# Patient Record
Sex: MALE | Race: WHITE | Employment: OTHER | URBAN - METROPOLITAN AREA
[De-identification: names, ages, dates, MRNs, and addresses within clinical notes are randomized per-mention and may not be internally consistent; named-entity substitution may affect disease eponyms.]

---

## 2017-05-26 ENCOUNTER — TELEPHONE (OUTPATIENT)
Dept: CARDIOLOGY CLINIC | Age: 57
End: 2017-05-26

## 2017-06-16 ENCOUNTER — TELEPHONE (OUTPATIENT)
Dept: CARDIOLOGY CLINIC | Age: 57
End: 2017-06-16

## 2017-08-07 RX ORDER — SIMVASTATIN 20 MG/1
TABLET, FILM COATED ORAL
Qty: 90 TAB | Refills: 0 | Status: SHIPPED | OUTPATIENT
Start: 2017-08-07 | End: 2017-10-23 | Stop reason: SDUPTHER

## 2017-09-07 ENCOUNTER — OFFICE VISIT (OUTPATIENT)
Dept: CARDIOLOGY CLINIC | Age: 57
End: 2017-09-07

## 2017-09-07 VITALS
OXYGEN SATURATION: 97 % | DIASTOLIC BLOOD PRESSURE: 86 MMHG | BODY MASS INDEX: 36.51 KG/M2 | SYSTOLIC BLOOD PRESSURE: 135 MMHG | WEIGHT: 255 LBS | HEART RATE: 80 BPM | HEIGHT: 70 IN

## 2017-09-07 DIAGNOSIS — R06.00 DYSPNEA, UNSPECIFIED TYPE: Primary | ICD-10-CM

## 2017-09-07 DIAGNOSIS — E78.5 HYPERLIPIDEMIA, UNSPECIFIED HYPERLIPIDEMIA TYPE: ICD-10-CM

## 2017-09-07 NOTE — PROGRESS NOTES
Bryce Conroy is a 62 y.o. male with a history of borderline hypertension, hyperlipidemia and sleep apnea. He also has obesity. Mr. Ruba Cabrales is here today for follow up appointment. In December, 2015 he went to [facility] with some epigastric burning discomfort. He had a stress echocardiogram, which was abnormal.  Cardiac cath was performed, however there was no evidence of coronary artery disease. Mr. Ruba Cabrales is here today for follow up appointment. He says he has noticed some minimal shortness of breath only with exertion when he climbs 1-2 flights of stairs. However, on a flat surface he does not notice any symptoms. He denies any PND or lower extremity swelling, he denies any chest pain or chest tightness. His only dyspnea is with climbing 1-2 flights of stairs. He denies any presyncope or syncope. He denies any hospital admissions. Past Medical History:  Past Medical History:   Diagnosis Date    Borderline hypertension     GERD (gastroesophageal reflux disease)     Hyperlipidemia     Obesity     JONNATHAN (obstructive sleep apnea)     S/P cardiac cath 12/15    No evidence of CAD     Surgical History:  Past Surgical History:   Procedure Laterality Date    HX HEMORRHOIDECTOMY  1990    HX MOHS PROCEDURES  2006    HX TONSILLECTOMY  1960s    UPPER ARM/ELBOW SURGERY UNLISTED       Current Meds:   Current Outpatient Prescriptions   Medication Sig Dispense Refill    simvastatin (ZOCOR) 20 mg tablet TAKE 1 TABLET BY MOUTH NIGHTLY 90 Tab 0    omeprazole (PRILOSEC) 40 mg capsule Take 40 mg by mouth daily.  doxycycline monohydrate 40 mg capsule Take 40 mg by mouth every morning.  sertraline (ZOLOFT) 50 mg tablet Take  by mouth daily.  fluticasone (FLONASE) 50 mcg/actuation nasal spray nightly.  montelukast (SINGULAIR) 10 mg tablet Take 10 mg by mouth daily.  betamethasone-calcipotriene (TACLONEX) 0.005-0.064 % ointment Apply  to affected area daily.       nitroglycerin (NITROSTAT) 0.4 mg SL tablet 1 Tab by SubLINGual route every five (5) minutes as needed for Chest Pain. 1 Bottle 2    multivitamins-minerals-lutein (CENTRUM SILVER) Tab Take  by mouth.  aspirin 81 mg tablet Take  by mouth. Social History:  Social History   Substance Use Topics    Smoking status: Never Smoker    Smokeless tobacco: Never Used    Alcohol use No     BP Readings from Last 3 Encounters:   09/07/17 135/86   06/02/16 127/79   05/21/15 116/78     Pulse Readings from Last 3 Encounters:   09/07/17 80   06/02/16 69   05/21/15 75     Physical Exam   Constitutional: He is oriented to person, place, and time. He appears well-developed. HENT: Head: Normocephalic and atraumatic. Neck: No JVD present. Cardiovascular: Regular rhythm. No extrasystoles are present. Exam reveals no gallop and no friction rub. No murmur heard. Pulmonary/Chest: Breath sounds normal. No respiratory distress. He has no wheezes. He has no rales. Abdominal: No tenderness. He has no rebound and no guarding. Musculoskeletal: He exhibits no edema. REVIEW OF DATA:    Last Lipids  Lab Results   Component Value Date/Time    Cholesterol, total 140 08/19/2011 12:00 AM    HDL Cholesterol 36 08/19/2011 12:00 AM    LDL, calculated 72 08/19/2011 12:00 AM    Triglyceride 158 08/19/2011 12:00 AM    CHOL/HDL Ratio 5.1 06/18/2010 08:43 AM      FLP   (09/2013) , , HDL 31, LDL 70  (12/2015)     TC  122, , HDL 31, LDL 64    Cardiac catheterization(06/2010)   mild luminal irregularities of all three major epicardial arteries. No significant stenosis. LV ejection fraction was 55%. 2D echocardiogram (06/2010)  1. Left ventricular chamber dimensions and systolic function normal,  approximate ejection fraction of 65%. 2. RA mildly dilated. Systolic function normal.    3. RS slightly dilated. 4. LA mildly dilated. 5. No intracardiac thrombus, vegetation, or mass. No pericardial effusion. No obvious shunts. 6. Aortic root normal. Aortic valve structure and leaflet motion normal.      STRESS ECHO (12/15)  CONCLUSION:  EKG EVIDENCE OF ISCHEMIA ASSOCIATED WITH MILD HYPOKINESIS OF DISTAL SEPTUM ON STRESS/ECHO  STUDY, SUGGESTING POSITIVE STUDY FOR ISCHEMIA. TALKED TO DR. Rodney Pulliam. Protocol  Protocol: Haris  Resting heart rate: 72  Resting BP: 108 / 82  Peak HR: 159 96.4 % max pred  Peak stress BP: 174 / 64 Double Product: 96401  Total Duration: 6 min 10 s Stage Achieved: Stage II  Stress / Exercise Symptoms: Shortness of breath  Medication Dose with Stress: None administered  Blood Pressure Response to Stress / Exercise: Appropriate  Reason for ending test: Target heart rate achieved  Stress / Exercise Tolerance: Normal    CARDIAC CATH (12/15)  LV Function: 60 % . No MR. No AS. LVEDP: 11 mm Hg   RCA: Large, distally divided to PL and PDA branches and they are all patent  Left main: Normal  LAD: LAD and Diagonal, good caliber and patent  Circumflex: C0-dominant, large OM1, fair posterior circumflex, and they are all patent. ASSESSMENT and PLAN  Mr. Stone Khan is a pleasant 62 y.o.  gentleman with a history of hypertension, hyperlipidemia, and sleep apnea. Borderline Hypertension:  Not on any meds. Stable. Continue with diet and exercise. BP today 135/86 mm Hg    Hyperlipidemia:  Last lipid profile was in 11/15 with good LDL control. He remains on Simvastatin 20 mg daily without any side effects. Repeat Lipid profile. Sleep apnea:  He uses CPAP machine infrequently. Counseled to use regularly. Obesity:  His weight today is 254--->255 pounds. He still does not follow diet or exercise program. He was advised again during [facility] visit. Will proceed with echo to eval EF and PAP as he has some dyspnea only with climbing flight of stairs. No fluid overload or anginal symptoms. Cath in 12/15 without any CAD.     Recent cath normal without any CAD    Thank you for allowing me to participate in the patient's care. Feel free to call me if you have any questions or concerns.

## 2017-09-07 NOTE — MR AVS SNAPSHOT
Visit Information Date & Time Provider Department Dept. Phone Encounter #  
 9/7/2017  9:00 AM Farshad Moon  [facility] Specialist at [facility] [phone] [id number] Follow-up Instructions Return in about 1 year (around 9/7/2018). Upcoming Health Maintenance Date Due Hepatitis C Screening 1960 DTaP/Tdap/Td series (1 - Tdap) 3/13/1981 FOBT Q 1 YEAR AGE 50-75 3/13/2010 INFLUENZA AGE 9 TO ADULT 8/1/2017 Allergies as of 9/7/2017  Review Complete On: 9/7/2017 By: Christoph Ocampo No Known Allergies Current Immunizations  Never Reviewed No immunizations on file. Not reviewed this visit You Were Diagnosed With   
  
 Codes Comments Dyspnea, unspecified type    -  Primary ICD-10-CM: R06.00 
ICD-9-CM: 786.09 Hyperlipidemia, unspecified hyperlipidemia type     ICD-10-CM: E78.5 ICD-9-CM: 272.4 Vitals BP Pulse Height(growth percentile) Weight(growth percentile) SpO2 BMI  
 135/86 80 5' 10\" (1.778 m) 255 lb (115.7 kg) 97% 36.59 kg/m2 Smoking Status Never Smoker BMI and BSA Data Body Mass Index Body Surface Area  
 36.59 kg/m 2 2.39 m 2 Preferred Pharmacy Pharmacy Name Phone [facility] [address] [place] [address] [place] [facility] [phone] Your Updated Medication List  
  
   
This list is accurate as of: 9/7/17  9:10 AM.  Always use your most recent med list.  
  
  
  
  
 aspirin 81 mg tablet Take  by mouth. CENTRUM SILVER Tab tablet Generic drug:  multivitamins-minerals-lutein Take  by mouth. doxycycline monohydrate 40 mg capsule Take 40 mg by mouth every morning. FLONASE 50 mcg/actuation nasal spray Generic drug:  fluticasone  
nightly. nitroglycerin 0.4 mg SL tablet Commonly known as:  NITROSTAT  
1 Tab by SubLINGual route every five (5) minutes as needed for Chest Pain. omeprazole 40 mg capsule Commonly known as:  PRILOSEC Take 40 mg by mouth daily. simvastatin 20 mg tablet Commonly known as:  ZOCOR  
TAKE 1 TABLET BY MOUTH NIGHTLY SINGULAIR 10 mg tablet Generic drug:  montelukast  
Take 10 mg by mouth daily. TACLONEX 0.005-0.064 % topical ointment Generic drug:  calcipotriene-betamethasone Apply  to affected area daily. ZOLOFT 50 mg tablet Generic drug:  sertraline Take  by mouth daily. Follow-up Instructions Return in about 1 year (around 9/7/2018). To-Do List   
 09/07/2017 ECHO:  2D ECHO COMPLETE ADULT (TTE) W OR WO CONTR   
  
 09/07/2017 Lab:  LIPID PANEL Patient Instructions ECHO - dyspnea - call [phone] for results Fasting lipid profile Introducing \[facility] [place] [facility]\"" & [facility] SERVICES! Dear Celeste Sullivan: Thank you for requesting a [employer] account. Our records indicate that you have previously registered for a [employer] account but its currently inactive. Please call our [employer] support line at [phone]. Additional Information If you have questions, please visit the Frequently Asked Questions section of the Truant[employer] website at [email or web address] Remember, TruantToday is NOT to be used for urgent needs. For medical emergencies, dial 911. Now available from your iPhone and Android! Please provide this summary of care documentation to your next provider. Your primary care clinician is listed as Ben Ornelas. If you have any questions after today's visit, please call [phone].

## 2017-10-23 RX ORDER — SIMVASTATIN 20 MG/1
TABLET, FILM COATED ORAL
Qty: 90 TAB | Refills: 3 | Status: SHIPPED | OUTPATIENT
Start: 2017-10-23 | End: 2018-12-27 | Stop reason: SDUPTHER

## 2017-12-18 ENCOUNTER — HOSPITAL ENCOUNTER (OUTPATIENT)
Dept: NON INVASIVE DIAGNOSTICS | Age: 57
Discharge: HOME | End: 2017-12-18
Attending: INTERNAL MEDICINE
Payer: COMMERCIAL

## 2017-12-18 DIAGNOSIS — R06.00 DYSPNEA, UNSPECIFIED TYPE: ICD-10-CM

## 2017-12-18 PROCEDURE — 93306 TTE W/DOPPLER COMPLETE: CPT

## 2018-06-08 ENCOUNTER — OFFICE VISIT (OUTPATIENT)
Dept: CARDIOLOGY CLINIC | Age: 58
End: 2018-06-08

## 2018-06-08 VITALS
OXYGEN SATURATION: 98 % | SYSTOLIC BLOOD PRESSURE: 134 MMHG | DIASTOLIC BLOOD PRESSURE: 84 MMHG | HEART RATE: 79 BPM | BODY MASS INDEX: 36.79 KG/M2 | HEIGHT: 70 IN | WEIGHT: 257 LBS

## 2018-06-08 DIAGNOSIS — R00.2 PALPITATIONS: ICD-10-CM

## 2018-06-08 DIAGNOSIS — R06.02 SOB (SHORTNESS OF BREATH): Primary | ICD-10-CM

## 2018-06-08 RX ORDER — BUPROPION HYDROCHLORIDE 300 MG/1
TABLET ORAL
Refills: 0 | COMMUNITY
Start: 2018-04-05 | End: 2019-12-10

## 2018-06-08 NOTE — PROGRESS NOTES
1. Have you been to the ER, urgent care clinic since your last visit? Hospitalized since your last visit? No    2. Have you seen or consulted any other health care providers outside of the [address] [place] [address] since your last visit? Include any pap smears or colon screening.  No

## 2018-06-08 NOTE — MR AVS SNAPSHOT
[facility] 
 
 
 [address] [facility] [address] [address] 
[phone] Patient: Laurence Mistry MRN: [id number] Visit Information Date & Time Provider Department Dept. Phone Encounter #  
 6/8/2018 10:30 AM Nura Hall MD Cardio Specialist at [facility] [place] [phone] [id number] Follow-up Instructions Return in about 4 weeks (around 7/6/2018). Your Appointments 7/9/2018  9:00 AM  
Follow Up with Nehemiah Sánchez. Oksana Alcocer PA-C Cardio Specialist at [facility] Appt Note: after holter and labs [address] [facility] [address] [address]  
  
   
 [id number] [place] [address] [address] Upcoming Health Maintenance Date Due Hepatitis C Screening 1960 DTaP/Tdap/Td series (1 - Tdap) 3/13/1981 FOBT Q 1 YEAR AGE 50-75 3/13/2010 Influenza Age 5 to Adult 8/1/2018 Allergies as of 6/8/2018  Review Complete On: 6/8/2018 By: Meghann Camp RN No Known Allergies Current Immunizations  Never Reviewed No immunizations on file. Not reviewed this visit You Were Diagnosed With   
  
 Codes Comments SOB (shortness of breath)    -  Primary ICD-10-CM: R06.02 
ICD-9-CM: 786.05 Palpitations     ICD-10-CM: R00.2 ICD-9-CM: 785.1 Vitals BP Pulse Height(growth percentile) Weight(growth percentile) SpO2 BMI  
 134/84 79 5' 10\" (1.778 m) 257 lb (116.6 kg) 98% 36.88 kg/m2 Smoking Status Never Smoker BMI and BSA Data Body Mass Index Body Surface Area  
 36.88 kg/m 2 2.4 m 2 Preferred Pharmacy Pharmacy Name Phone [facility] [address] [address] [place] [facility] [phone] Your Updated Medication List  
  
   
This list is accurate as of 6/8/18 11:19 AM.  Always use your most recent med list.  
  
  
  
  
 aspirin 81 mg tablet Take  by mouth. buPROPion  mg XL tablet Commonly known as:  WELLBUTRIN XL  
TAKE 1 TABLET BY MOUTH EVERY DAY  
  
 CENTRUM SILVER Tab tablet Generic drug:  multivitamins-minerals-lutein Take  by mouth. doxycycline monohydrate 40 mg capsule Take 40 mg by mouth every morning. FLONASE 50 mcg/actuation nasal spray Generic drug:  fluticasone  
nightly. nitroglycerin 0.4 mg SL tablet Commonly known as:  NITROSTAT  
1 Tab by SubLINGual route every five (5) minutes as needed for Chest Pain. omeprazole 40 mg capsule Commonly known as:  PRILOSEC Take 40 mg by mouth daily. simvastatin 20 mg tablet Commonly known as:  ZOCOR  
TAKE 1 TABLET BY MOUTH NIGHTLY SINGULAIR 10 mg tablet Generic drug:  montelukast  
Take 10 mg by mouth daily. TACLONEX 0.005-0.064 % topical ointment Generic drug:  calcipotriene-betamethasone Apply  to affected area daily. ZOLOFT 50 mg tablet Generic drug:  sertraline Take  by mouth daily. We Performed the Following AMB POC EKG ROUTINE W/ 12 LEADS, INTER & REP [26261 CPT(R)] Follow-up Instructions Return in about 4 weeks (around 7/6/2018). Introducing hospitals & HEALTH SERVICES! Dear Sky Spangler: Thank you for requesting a [employer] account. Our records indicate that you have previously registered for a Exterity account but its currently inactive. Please call our [employer]ter[employer] support line at [phone]. Additional Information If you have questions, please visit the Frequently Asked Questions section of the [employer] website at [email or web address] Remember, [employer]ter[employer] is NOT to be used for urgent needs. For medical emergencies, dial 911. Now available from your iPhone and Android! Please provide this summary of care documentation to your next provider. Your primary care clinician is listed as Jacque Zhao. If you have any questions after today's visit, please call [phone].

## 2018-06-08 NOTE — LETTER
NOTIFICATION OF RETURN TO WORK / SCHOOL 
 
6/8/2018 11:19 AM 
 
Mr. Serene Mills 47 [address] [facility] [address] [address] To Whom It May Concern: 
 
Serene Bunch was under the care of [address] June 8, 2018 If there are questions or concerns please have the patient contact our office. Sincerely, Florencio Augustine MD

## 2018-06-08 NOTE — PROGRESS NOTES
Miguel Bar is a 62 y.o. male with a history of borderline hypertension, hyperlipidemia and sleep apnea. He also has obesity. Mr. Jeni Majano is here today for follow up appointment. He said he had two episodes of dizziness, one was on this past Monday and the other one was on Wednesday. He said the one on Monday happened while he was at home. There were no preceding symptoms of palpitations, however wife thinks that he probably was having palpitations. He looked somewhat cold and clammy. He said he's been eating and drinking okay. This episode happened twice. He did not have any chest pain or chest tightness. That lasted for a couple hours. He did not lose consciousness. He personally does not recall having any palpitations. He denied any sweating. He denied any heat exhaustion or working outside in the yard during these episodes. Last episode was more than 48 hours ago and he's been feeling fine since then. Otherwise he has no complaint of chest pain, chest tightness, PND or lower extremity swelling. Past Medical History:  Past Medical History:   Diagnosis Date    Borderline hypertension     GERD (gastroesophageal reflux disease)     Hyperlipidemia     Obesity     JONNATHAN (obstructive sleep apnea)     S/P cardiac cath 12/15    No evidence of CAD     Surgical History:  Past Surgical History:   Procedure Laterality Date    HX HEMORRHOIDECTOMY  1990    HX MOHS PROCEDURES  2006    HX TONSILLECTOMY  1960s    UPPER ARM/ELBOW SURGERY UNLISTED       Current Meds:   Current Outpatient Prescriptions   Medication Sig Dispense Refill    buPROPion XL (WELLBUTRIN XL) 300 mg XL tablet TAKE 1 TABLET BY MOUTH EVERY DAY  0    simvastatin (ZOCOR) 20 mg tablet TAKE 1 TABLET BY MOUTH NIGHTLY 90 Tab 3    omeprazole (PRILOSEC) 40 mg capsule Take 40 mg by mouth daily.  doxycycline monohydrate 40 mg capsule Take 40 mg by mouth every morning.  sertraline (ZOLOFT) 50 mg tablet Take  by mouth daily.       fluticasone (FLONASE) 50 mcg/actuation nasal spray nightly.  montelukast (SINGULAIR) 10 mg tablet Take 10 mg by mouth daily.  betamethasone-calcipotriene (TACLONEX) 0.005-0.064 % ointment Apply  to affected area daily.  nitroglycerin (NITROSTAT) 0.4 mg SL tablet 1 Tab by SubLINGual route every five (5) minutes as needed for Chest Pain. 1 Bottle 2    multivitamins-minerals-lutein (CENTRUM SILVER) Tab Take  by mouth.  aspirin 81 mg tablet Take  by mouth. Social History:  Social History   Substance Use Topics    Smoking status: Never Smoker    Smokeless tobacco: Never Used    Alcohol use No     BP Readings from Last 3 Encounters:   06/08/18 134/84   09/07/17 135/86   06/02/16 127/79     Pulse Readings from Last 3 Encounters:   06/08/18 79   09/07/17 80   06/02/16 69     Physical Exam   Constitutional: He is oriented to person, place, and time. He appears well-developed. HENT: Head: Normocephalic and atraumatic. Neck: No JVD present. Cardiovascular: Regular rhythm. No extrasystoles are present. Exam reveals no gallop and no friction rub. No murmur heard. Pulmonary/Chest: Breath sounds normal. No respiratory distress. He has no wheezes. He has no rales. Abdominal: No tenderness. He has no rebound and no guarding. Musculoskeletal: He exhibits no edema. REVIEW OF DATA:    Last Lipids  Lab Results   Component Value Date/Time    Cholesterol, total 140 08/19/2011 12:00 AM    HDL Cholesterol 36 (L) 08/19/2011 12:00 AM    LDL, calculated 72 08/19/2011 12:00 AM    Triglyceride 158 (H) 08/19/2011 12:00 AM    CHOL/HDL Ratio 5.1 (H) 06/18/2010 08:43 AM      FLP   (09/2013) , , HDL 31, LDL 70  (12/2015)     TC  122, , HDL 31, LDL 64    Cardiac catheterization(06/2010)   mild luminal irregularities of all three major epicardial arteries. No significant stenosis. LV ejection fraction was 55%.     2D echocardiogram (12/17)  Left ventricle: Systolic function was normal by visual assessment. Ejection   fraction was estimated to be 60 %. No  obvious wall motion abnormalities identified in the views obtained. Wall   thickness was moderately increased. Left  ventricular diastolic function parameters were normal for the patient's age. Right ventricle: Systolic function was normal.  Left atrium: The atrium was mildly dilated. Right atrium: The atrium was dilated. Mitral valve: There was mild regurgitation. Aortic valve: There was no stenosis. STRESS ECHO (12/15)  CONCLUSION:  EKG EVIDENCE OF ISCHEMIA ASSOCIATED WITH MILD HYPOKINESIS OF DISTAL SEPTUM ON STRESS/ECHO  STUDY, SUGGESTING POSITIVE STUDY FOR ISCHEMIA. TALKED TO DR. La Hurd. Protocol  Protocol: Haris  Resting heart rate: 72  Resting BP: 108 / 82  Peak HR: 159 96.4 % max pred  Peak stress BP: 174 / 64 Double Product: 87796  Total Duration: 6 min 10 s Stage Achieved: Stage II  Stress / Exercise Symptoms: Shortness of breath  Medication Dose with Stress: None administered  Blood Pressure Response to Stress / Exercise: Appropriate  Reason for ending test: Target heart rate achieved  Stress / Exercise Tolerance: Normal    CARDIAC CATH (12/15)  LV Function: 60 % . No MR. No AS. LVEDP: 11 mm Hg   RCA: Large, distally divided to PL and PDA branches and they are all patent  Left main: Normal  LAD: LAD and Diagonal, good caliber and patent  Circumflex: C0-dominant, large OM1, fair posterior circumflex, and they are all patent. ASSESSMENT and PLAN  Mr. Florentino Jordan is a pleasant 62 y.o.  gentleman with a history of hypertension, hyperlipidemia, and sleep apnea. Borderline Hypertension:  Not on any meds. Stable. BP today 134/84 mm Hg    Hyperlipidemia:  Last lipid profile was in 11/15 with good LDL control. He remains on Simvastatin 20 mg daily without any side effects. Repeat Lipid profile before next visit. Sleep apnea:  He uses CPAP machine infrequently. Still snores per wife.  Defer to PCP    Obesity:  His weight today is 254--->258 pounds. He still does not follow diet or exercise program. He was advised again during [facility] visit to start diet and life style modification. Dizziness:   No syncope or LOC. Two episode. He denies palpitations but wife think he did have one  No recurrence. ECHO in 12/17 with normal EF  No carotid bruit on exam  No heart failure on exam  Will place Holter monitor  EKG unremarkable today  Doubt cardiac etiology    Thank you for allowing me to participate in the patient's care. Feel free to call me if you have any questions or concerns.

## 2018-06-12 ENCOUNTER — HOSPITAL ENCOUNTER (OUTPATIENT)
Dept: LAB | Age: 58
Discharge: HOME | End: 2018-06-12
Attending: INTERNAL MEDICINE
Payer: COMMERCIAL

## 2018-06-12 ENCOUNTER — HOSPITAL ENCOUNTER (OUTPATIENT)
Dept: NON INVASIVE DIAGNOSTICS | Age: 58
Discharge: HOME | End: 2018-06-12
Attending: INTERNAL MEDICINE
Payer: COMMERCIAL

## 2018-06-12 DIAGNOSIS — R06.02 SOB (SHORTNESS OF BREATH): ICD-10-CM

## 2018-06-12 DIAGNOSIS — R00.2 PALPITATIONS: ICD-10-CM

## 2018-06-12 LAB
CHOLEST SERPL-MCNC: 131 MG/DL
HDLC SERPL-MCNC: 24 MG/DL (ref 40–60)
HDLC SERPL: 5.5 {RATIO} (ref 0–5)
LDLC SERPL CALC-MCNC: 67.6 MG/DL (ref 0–100)
LIPID PROFILE,FLP: ABNORMAL
TRIGL SERPL-MCNC: 197 MG/DL (ref ?–150)
VLDLC SERPL CALC-MCNC: 39.4 MG/DL

## 2018-06-12 PROCEDURE — 80061 LIPID PANEL: CPT | Performed by: INTERNAL MEDICINE

## 2018-06-12 PROCEDURE — 93225 XTRNL ECG REC<48 HRS REC: CPT

## 2018-06-12 PROCEDURE — 36415 COLL VENOUS BLD VENIPUNCTURE: CPT | Performed by: INTERNAL MEDICINE

## 2018-07-09 ENCOUNTER — OFFICE VISIT (OUTPATIENT)
Dept: CARDIOLOGY CLINIC | Age: 58
End: 2018-07-09

## 2018-07-09 VITALS
SYSTOLIC BLOOD PRESSURE: 127 MMHG | DIASTOLIC BLOOD PRESSURE: 81 MMHG | OXYGEN SATURATION: 97 % | WEIGHT: 262 LBS | BODY MASS INDEX: 37.51 KG/M2 | HEART RATE: 78 BPM | HEIGHT: 70 IN

## 2018-07-09 DIAGNOSIS — E78.5 DYSLIPIDEMIA: Primary | ICD-10-CM

## 2018-07-09 RX ORDER — LANOLIN ALCOHOL/MO/W.PET/CERES
400 CREAM (GRAM) TOPICAL DAILY
COMMUNITY
End: 2020-08-19 | Stop reason: ALTCHOICE

## 2018-07-09 NOTE — PROGRESS NOTES
1. Have you been to the ER, urgent care clinic since your last visit? Hospitalized since your last visit? No     2. Have you seen or consulted any other health care providers outside of the [address] [place] [address] since your last visit? Include any pap smears or colon screening.   No

## 2018-07-09 NOTE — LETTER
7/9/2018 9:36 AM 
 
Mr. Tamika Patterson. [facility] [address] [id number] Mr. Scott has very few PVC's on holter monitor, (very mild early beats). Nothing to worry about or start any new treatment. His cholesterol numbers, LDL (bad cholesterol) was in normal range at 67. His HDL (good cholesterol) was low at 24. This can all be improved with diet and exercise. He does not require any change in medication. Sincerely, Amy Love.  Chen Ramey PA-C

## 2018-07-09 NOTE — PATIENT INSTRUCTIONS
Cholesterol lab work to be completed prior to yearly follow up - fasting 12-15 hours prior.  No appt needed

## 2018-07-09 NOTE — PROGRESS NOTES
Meghann Weston is a 62 y.o. male with a history of borderline hypertension, hyperlipidemia and sleep apnea. He also has obesity. Mr. Justice Mazariegos is here today for follow up appointment. He saw Dr. Peyton DeL a O approximately one month ago, and had some complaints of occasional dizziness. A holter monitor was ordered, along with repeat labwork. Since that visit, he denies any additional feelings of dizziness. No syncope/near syncope, chest pain/tightness, orthopnea, LE edema or PND. He does have some dyspnea on exertion, which he attributes to being overweight. He has JONNATHAN and uses CPAP machine. Past Medical History:  Past Medical History:   Diagnosis Date    Borderline hypertension     GERD (gastroesophageal reflux disease)     Hyperlipidemia     Obesity     JONNATHAN (obstructive sleep apnea)     S/P cardiac cath 12/15    No evidence of CAD     Surgical History:  Past Surgical History:   Procedure Laterality Date    HX HEMORRHOIDECTOMY  1990    HX MOHS PROCEDURES  2006    HX TONSILLECTOMY  1960s    UPPER ARM/ELBOW SURGERY UNLISTED       Current Meds:   Current Outpatient Prescriptions   Medication Sig Dispense Refill    magnesium oxide (MAG-OX) 400 mg tablet Take 400 mg by mouth daily.  buPROPion XL (WELLBUTRIN XL) 300 mg XL tablet TAKE 1 TABLET BY MOUTH EVERY DAY  0    simvastatin (ZOCOR) 20 mg tablet TAKE 1 TABLET BY MOUTH NIGHTLY 90 Tab 3    omeprazole (PRILOSEC) 40 mg capsule Take 40 mg by mouth daily.  sertraline (ZOLOFT) 50 mg tablet Take 150 mg by mouth daily.  fluticasone (FLONASE) 50 mcg/actuation nasal spray nightly.  montelukast (SINGULAIR) 10 mg tablet Take 10 mg by mouth daily.  nitroglycerin (NITROSTAT) 0.4 mg SL tablet 1 Tab by SubLINGual route every five (5) minutes as needed for Chest Pain. 1 Bottle 2    multivitamins-minerals-lutein (CENTRUM SILVER) Tab Take  by mouth.  aspirin 81 mg tablet Take  by mouth.        Social History:  Social History   Substance Use Topics  Smoking status: Never Smoker    Smokeless tobacco: Never Used    Alcohol use No     BP Readings from Last 3 Encounters:   07/09/18 127/81   06/08/18 134/84   09/07/17 135/86     Pulse Readings from Last 3 Encounters:   07/09/18 78   06/08/18 79   09/07/17 80     Physical Exam   Constitutional: He is oriented to person, place, and time. He appears well-developed. HENT: Head: Normocephalic and atraumatic. Neck: No JVD present. Cardiovascular: Regular rhythm. No extrasystoles are present. Exam reveals no gallop and no friction rub. No murmur heard. Pulmonary/Chest: Breath sounds normal. No respiratory distress. He has no wheezes. He has no rales. Abdominal: No tenderness. He has no rebound and no guarding. Musculoskeletal: He exhibits no edema. REVIEW OF DATA:    Last Lipids  Lab Results   Component Value Date/Time    Cholesterol, total 131 06/12/2018 12:09 PM    HDL Cholesterol 24 (L) 06/12/2018 12:09 PM    LDL, calculated 67.6 06/12/2018 12:09 PM    Triglyceride 197 (H) 06/12/2018 12:09 PM    CHOL/HDL Ratio 5.5 (H) 06/12/2018 12:09 PM      FLP   (09/2013) , , HDL 31, LDL 70  (12/2015)     TC  122, , HDL 31, LDL 64    Cardiac catheterization(06/2010)   mild luminal irregularities of all three major epicardial arteries. No significant stenosis. LV ejection fraction was 55%. 2D echocardiogram (12/17)  Left ventricle: Systolic function was normal by visual assessment. Ejection   fraction was estimated to be 60 %. No  obvious wall motion abnormalities identified in the views obtained. Wall   thickness was moderately increased. Left  ventricular diastolic function parameters were normal for the patient's age. Right ventricle: Systolic function was normal.  Left atrium: The atrium was mildly dilated. Right atrium: The atrium was dilated. Mitral valve: There was mild regurgitation. Aortic valve: There was no stenosis.     STRESS ECHO (12/15)  CONCLUSION:  EKG EVIDENCE OF ISCHEMIA ASSOCIATED WITH MILD HYPOKINESIS OF DISTAL SEPTUM ON STRESS/ECHO  STUDY, SUGGESTING POSITIVE STUDY FOR ISCHEMIA. TALKED TO DR. Riki Elizabeth. Protocol  Protocol: Haris  Resting heart rate: 72  Resting BP: 108 / 82  Peak HR: 159 96.4 % max pred  Peak stress BP: 174 / 64 Double Product: 41278  Total Duration: 6 min 10 s Stage Achieved: Stage II  Stress / Exercise Symptoms: Shortness of breath  Medication Dose with Stress: None administered  Blood Pressure Response to Stress / Exercise: Appropriate  Reason for ending test: Target heart rate achieved  Stress / Exercise Tolerance: Normal    CARDIAC CATH (12/15)  LV Function: 60 % . No MR. No AS. LVEDP: 11 mm Hg   RCA: Large, distally divided to PL and PDA branches and they are all patent  Left main: Normal  LAD: LAD and Diagonal, good caliber and patent  Circumflex: C0-dominant, large OM1, fair posterior circumflex, and they are all patent. Holter Monitor 06/12/18  1. Rhythm is Sinus with Sinus Arrhythmia at times. 2. WA and QRS are within normal limits. 3. (413) single VE's, (2) paired. 4. (14) single SVE's. Electronically signed by Pa Felder MD on Jun 26 2018  7:11AM CDT      ASSESSMENT and PLAN  Mr. Yesy Gutierres is a pleasant 62 y.o.  gentleman with a history of hypertension, hyperlipidemia, and sleep apnea. Borderline Hypertension:  Not on any meds. Stable. BP today 127/81    Hyperlipidemia:  Last lipid profile was 06/12/18 with LDL 67. Continue with Zetia. HDL is low at 24, advised of importance of heart healthy diet and exercise. Repeat lipid panel prior to next office visit in one year, or sooner if needed. Sleep apnea:  He uses CPAP machine. Obesity:  His weight today is 254--->258 pounds--->262 lbs. He still does not follow diet or exercise program. He was advised again during [facility] visit to start diet and life style modification. Dizziness:   Resolved. Holter monitor with infrequent PVC's. Reassurance offered.      Thank you for allowing me to participate in the patient's care. Feel free to call me if you have any questions or concerns.     Luh Taylor PA-C

## 2018-07-09 NOTE — MR AVS SNAPSHOT
[address] [facility] [address] [facility] [address] [id number] 
[phone] Patient: Taylor Ferrari MRN: [id number] OR Visit Information Date & Time Provider Department Dept. Phone Encounter #  
 2018  9:00 AM Jeff Aguilar Specialist at [facility] [phone] Follow-up Instructions Return in about 1 year (around 2019). Upcoming Health Maintenance Date Due Hepatitis C Screening 1960 DTaP/Tdap/Td series (1 - Tdap) 3/13/1981 FOBT Q 1 YEAR AGE 50-75 3/13/2010 Influenza Age 5 to Adult 2018 Allergies as of 2018  Review Complete On: 2018 By: Vanita Cifuentes PA-C No Known Allergies Current Immunizations  Never Reviewed No immunizations on file. Not reviewed this visit Vitals BP Pulse Height(growth percentile) Weight(growth percentile) SpO2 BMI  
 127/81 78 5' 10\" (1.778 m) 262 lb (118.8 kg) 97% 37.59 kg/m2 Smoking Status Never Smoker BMI and BSA Data Body Mass Index Body Surface Area  
 37.59 kg/m 2 2.42 m 2 Preferred Pharmacy Pharmacy Name Phone [facility] [address] [place] [address] [place] [facility] [phone] Your Updated Medication List  
  
   
This list is accurate as of 18  9:39 AM.  Always use your most recent med list.  
  
  
  
  
 aspirin 81 mg tablet Take  by mouth. buPROPion  mg XL tablet Commonly known as:  WELLBUTRIN XL  
TAKE 1 TABLET BY MOUTH EVERY DAY  
  
 CENTRUM SILVER Tab tablet Generic drug:  multivitamins-minerals-lutein Take  by mouth. FLONASE 50 mcg/actuation nasal spray Generic drug:  fluticasone  
nightly.  
  
 magnesium oxide 400 mg tablet Commonly known as:  MAG-OX Take 400 mg by mouth daily. nitroglycerin 0.4 mg SL tablet Commonly known as:  NITROSTAT 1 Tab by SubLINGual route every five (5) minutes as needed for Chest Pain. omeprazole 40 mg capsule Commonly known as:  PRILOSEC Take 40 mg by mouth daily. simvastatin 20 mg tablet Commonly known as:  ZOCOR  
TAKE 1 TABLET BY MOUTH NIGHTLY SINGULAIR 10 mg tablet Generic drug:  montelukast  
Take 10 mg by mouth daily. ZOLOFT 50 mg tablet Generic drug:  sertraline Take 150 mg by mouth daily. Follow-up Instructions Return in about 1 year (around 7/9/2019). Patient Instructions Cholesterol lab work to be completed prior to yearly follow up - fasting 12-15 hours prior. No appt needed Introducing [facility]! [facility] introduces [employer] patient portal. Now you can access parts of your medical record, email your doctor's office, and request medication refills online. 1. In your internet browser, go to [email or web address]://[employer]. [employer] 2. Click on the First Time User? Click Here link in the Sign In box. You will see the New Member Sign Up page. 3. Enter your [employer] Access Code exactly as it appears below. You will not need to use this code after youve completed the sign-up process. If you do not sign up before the expiration date, you must request a new code. · [employer] Access Code: [email or web address] Expires: 9/9/2018 11:05 AM 
 
4. Enter the last four digits of your Social Security Number (xxxx) and Date of Birth (mm/dd/yyyy) as indicated and click Submit. You will be taken to the next sign-up page. 5. Create a [employer] ID. This will be your [employer] login ID and cannot be changed, so think of one that is secure and easy to remember. 6. Create a [employer] password. You can change your password at any time. 7. Enter your Password Reset Question and Answer. This can be used at a later time if you forget your password. 8. Enter your e-mail address.  You will receive e-mail notification when new information is available in [employer]. 9. Click Sign Up. You can now view and download portions of your medical record. 10. Click the Download Summary menu link to download a portable copy of your medical information. If you have questions, please visit the Frequently Asked Questions section of the [employer] website. Remember, [employer] is NOT to be used for urgent needs. For medical emergencies, dial 911. Now available from your iPhone and Android! Please provide this summary of care documentation to your next provider. Your primary care clinician is listed as Fabien Sanford. If you have any questions after today's visit, please call [phone].

## 2018-12-27 RX ORDER — SIMVASTATIN 20 MG/1
TABLET, FILM COATED ORAL
Qty: 90 TAB | Refills: 3 | Status: SHIPPED | OUTPATIENT
Start: 2018-12-27 | End: 2019-11-04 | Stop reason: SDUPTHER

## 2018-12-27 NOTE — TELEPHONE ENCOUNTER
PCP: Kyle Mejia MD    Last appt: 7/9/2018  No future appointments. Requested Prescriptions     Pending Prescriptions Disp Refills    simvastatin (ZOCOR) 20 mg tablet 90 Tab 3     Sig: TAKE 1 TABLET BY MOUTH NIGHTLY       Other: Dr Shirley Roque pt.

## 2019-11-04 ENCOUNTER — TELEPHONE (OUTPATIENT)
Dept: CARDIOLOGY CLINIC | Age: 59
End: 2019-11-04

## 2019-11-04 NOTE — TELEPHONE ENCOUNTER
Attempted to contact pt at  number, no answer. Lvm for pt to return call to office at [phone]. Will continue to try to contact pt.        Re: overdue for appt; requested medication refill via phone

## 2019-11-04 NOTE — TELEPHONE ENCOUNTER
PCP: Gabriela Hoffmann MD    Last appt: 7/9/2018  No future appointments. Requested Prescriptions     Pending Prescriptions Disp Refills    simvastatin (ZOCOR) 20 mg tablet 30 Tab 0     Sig: TAKE 1 TABLET BY MOUTH NIGHTLY. Appt required before further refill can be authorized.

## 2019-11-05 RX ORDER — SIMVASTATIN 20 MG/1
TABLET, FILM COATED ORAL
Qty: 30 TAB | Refills: 0 | Status: SHIPPED | OUTPATIENT
Start: 2019-11-05 | End: 2019-12-12 | Stop reason: SDUPTHER

## 2019-12-10 ENCOUNTER — OFFICE VISIT (OUTPATIENT)
Dept: CARDIOLOGY CLINIC | Age: 59
End: 2019-12-10

## 2019-12-10 VITALS
HEIGHT: 70 IN | HEART RATE: 85 BPM | WEIGHT: 297 LBS | OXYGEN SATURATION: 98 % | SYSTOLIC BLOOD PRESSURE: 134 MMHG | BODY MASS INDEX: 42.52 KG/M2 | DIASTOLIC BLOOD PRESSURE: 85 MMHG

## 2019-12-10 DIAGNOSIS — R00.2 PALPITATIONS: ICD-10-CM

## 2019-12-10 DIAGNOSIS — R60.9 EDEMA, UNSPECIFIED TYPE: Primary | ICD-10-CM

## 2019-12-10 DIAGNOSIS — R06.00 DYSPNEA, UNSPECIFIED TYPE: ICD-10-CM

## 2019-12-10 RX ORDER — CHLORTHALIDONE 25 MG/1
25 TABLET ORAL DAILY
Qty: 30 TAB | Refills: 5 | Status: SHIPPED | OUTPATIENT
Start: 2019-12-10 | End: 2020-06-16

## 2019-12-10 NOTE — PATIENT INSTRUCTIONS
Holter and Echo- 
Please call central scheduling at [phone] All testing/lab work is completed at [facility] [address], [facility] [address] Start Hygroten 25 mg daily Please call central scheduling at [phone] All testing/lab work is completed at [facility] [address], [facility] [address] BMP (lab)in 2 weeks Eat bananas No appointment required for lab work Hours are Mon-Fri 7:00 am-5:30 pm

## 2019-12-10 NOTE — TELEPHONE ENCOUNTER
PCP: Meryle Parent, MD    Last appt: 7/9/2018  Future Appointments   Date Time Provider Mian Cantrell   12/10/2019 10:00 AM Sandra Feliz  Darrin Thurman   6/11/2020  9:00 AM Sandra Feliz  Darrin [place]       Requested Prescriptions     Pending Prescriptions Disp Refills    chlorthalidone (HYGROTEN) 25 mg tablet 30 Tab 5     Sig: Take 1 Tab by mouth daily.

## 2019-12-10 NOTE — PROGRESS NOTES
Sasha Trevino is a 61 y.o. male with a history of borderline hypertension, hyperlipidemia and sleep apnea. He also has obesity. Mr. Zachary Grubbs is here today for follow up appointment. I have not seen him for almost 18 months. He denies any hospital admission or ER visits. He admits that he has not been working out and has gained almost 30 pounds since senior living. He is trying to make lifestyle changes. He does have some edema and also has some dyspnea on moderate exertion especially climbing 2 flight of stairs. No chest pain or chest tightness. He denies PND. Past Medical History:  Past Medical History:   Diagnosis Date    Borderline hypertension     GERD (gastroesophageal reflux disease)     Hyperlipidemia     Obesity     JONNATHAN (obstructive sleep apnea)     S/P cardiac cath 12/15    No evidence of CAD     Surgical History:  Past Surgical History:   Procedure Laterality Date    HX HEMORRHOIDECTOMY  1990    HX MOHS PROCEDURES  2006    HX TONSILLECTOMY  1960s    UPPER ARM/ELBOW SURGERY UNLISTED       Current Meds:   Current Outpatient Medications   Medication Sig Dispense Refill    simvastatin (ZOCOR) 20 mg tablet TAKE 1 TABLET BY MOUTH NIGHTLY. Appt required before further refill can be authorized. 30 Tab 0    magnesium oxide (MAG-OX) 400 mg tablet Take 400 mg by mouth daily.  omeprazole (PRILOSEC) 40 mg capsule Take 40 mg by mouth daily.  fluticasone (FLONASE) 50 mcg/actuation nasal spray nightly.  montelukast (SINGULAIR) 10 mg tablet Take 10 mg by mouth daily.  nitroglycerin (NITROSTAT) 0.4 mg SL tablet 1 Tab by SubLINGual route every five (5) minutes as needed for Chest Pain. 1 Bottle 2    multivitamins-minerals-lutein (CENTRUM SILVER) Tab Take  by mouth.  aspirin 81 mg tablet Take  by mouth.        Social History:  Social History     Tobacco Use    Smoking status: Never Smoker    Smokeless tobacco: Never Used   Substance Use Topics    Alcohol use: No    Drug use: No     BP Readings from Last 3 Encounters:   12/10/19 134/85   07/09/18 127/81   06/08/18 134/84     Pulse Readings from Last 3 Encounters:   12/10/19 85   07/09/18 78   06/08/18 79     Wt Readings from Last 3 Encounters:   12/10/19 297 lb (134.7 kg)   07/09/18 262 lb (118.8 kg)   06/08/18 257 lb (116.6 kg)     Physical Exam   Constitutional: He is oriented to person, place, and time. He appears well-developed. HENT: Head: Normocephalic and atraumatic. Neck: No JVD present. Cardiovascular: Regular rhythm. No extrasystoles are present. Exam reveals no gallop and no friction rub. No murmur heard. Pulmonary/Chest: Breath sounds normal. No respiratory distress. He has no wheezes. He has no rales. Abdominal: No tenderness. He has no rebound and no guarding. Musculoskeletal: He exhibits 1+ edema. REVIEW OF DATA:    Last Lipids  Lab Results   Component Value Date/Time    Cholesterol, total 131 06/12/2018 12:09 PM    HDL Cholesterol 24 (L) 06/12/2018 12:09 PM    LDL, calculated 67.6 06/12/2018 12:09 PM    Triglyceride 197 (H) 06/12/2018 12:09 PM    CHOL/HDL Ratio 5.5 (H) 06/12/2018 12:09 PM      FLP   (09/2013) , , HDL 31, LDL 70  (12/2015)     TC  122, , HDL 31, LDL 64    Cardiac catheterization(06/2010)   mild luminal irregularities of all three major epicardial arteries. No significant stenosis. LV ejection fraction was 55%. 2D echocardiogram (12/17)  Left ventricle: Systolic function was normal by visual assessment. Ejection fraction was estimated to be 60 %. No  obvious wall motion abnormalities identified in the views obtained. Wall thickness was moderately increased. Left  ventricular diastolic function parameters were normal for the patient's age. Right ventricle: Systolic function was normal.  Left atrium: The atrium was mildly dilated. Right atrium: The atrium was dilated. Mitral valve: There was mild regurgitation. Aortic valve: There was no stenosis.     STRESS ECHO (12/15)  CONCLUSION:  EKG EVIDENCE OF ISCHEMIA ASSOCIATED WITH MILD HYPOKINESIS OF DISTAL SEPTUM ON STRESS/ECHO  STUDY, SUGGESTING POSITIVE STUDY FOR ISCHEMIA. TALKED TO DR. Neida Uribe. Protocol  Protocol: Haris  Resting heart rate: 72  Resting BP: 108 / 82  Peak HR: 159 96.4 % max pred  Peak stress BP: 174 / 64 Double Product: 28613  Total Duration: 6 min 10 s Stage Achieved: Stage II  Stress / Exercise Symptoms: Shortness of breath  Medication Dose with Stress: None administered  Blood Pressure Response to Stress / Exercise: Appropriate  Reason for ending test: Target heart rate achieved  Stress / Exercise Tolerance: Normal    CARDIAC CATH (12/15)  LV Function: 60 % . No MR. No AS. LVEDP: 11 mm Hg   RCA: Large, distally divided to PL and PDA branches and they are all patent  Left main: Normal  LAD: LAD and Diagonal, good caliber and patent  Circumflex: C0-dominant, large OM1, fair posterior circumflex, and they are all patent. Holter Monitor 06/12/18  1. Rhythm is Sinus with Sinus Arrhythmia at times. 2. MI and QRS are within normal limits. 3. (413) single VE's, (2) paired. 4. (14) single SVE's. Electronically signed by Aretha Ni MD on Jun 26 2018  7:11AM CDT      ASSESSMENT and PLAN  Mr. Barbara Templeton is a pleasant 61 y.o.  gentleman with a history of hypertension, hyperlipidemia, and sleep apnea. Hypertension: /85 mmHg  Starting chlorthalidone 25 mill grams daily. Side effect discussed. We will check BMP in 2 weeks  Order echo because of edema and dyspnea    Hyperlipidemia:  Last lipid profile showed LDL level of 67. Continue simvastatin. Sleep apnea:  He uses CPAP machine. Obesity:  His weight today is 254--->297 lbs. he has recently joined a gym and also working on his diet. Weight loss was discussed with lifestyle modification. He understands the plan    Palpitation:  On a daily basis. Especially at night.   Will place Holter monitor  Echo ordered    Thank you for allowing me to participate in the patient's care. Feel free to call me if you have any questions or concerns.

## 2019-12-13 RX ORDER — SIMVASTATIN 20 MG/1
TABLET, FILM COATED ORAL
Qty: 90 TAB | Refills: 0 | Status: SHIPPED | OUTPATIENT
Start: 2019-12-13 | End: 2020-03-09

## 2019-12-24 ENCOUNTER — HOSPITAL ENCOUNTER (OUTPATIENT)
Dept: NON INVASIVE DIAGNOSTICS | Age: 59
Discharge: HOME | End: 2019-12-24
Attending: INTERNAL MEDICINE
Payer: OTHER GOVERNMENT

## 2019-12-24 VITALS
HEIGHT: 70 IN | WEIGHT: 297 LBS | DIASTOLIC BLOOD PRESSURE: 85 MMHG | BODY MASS INDEX: 42.52 KG/M2 | SYSTOLIC BLOOD PRESSURE: 134 MMHG

## 2019-12-24 DIAGNOSIS — R60.9 EDEMA, UNSPECIFIED TYPE: ICD-10-CM

## 2019-12-24 DIAGNOSIS — R00.2 PALPITATIONS: ICD-10-CM

## 2019-12-24 DIAGNOSIS — R06.00 DYSPNEA, UNSPECIFIED TYPE: ICD-10-CM

## 2019-12-24 LAB
AV VELOCITY RATIO: 0.88
ECHO AO ASC DIAM: 3.02 CM
ECHO AO ROOT DIAM: 3.43 CM
ECHO AV AREA PEAK VELOCITY: 4.7 CM2
ECHO AV AREA/BSA PEAK VELOCITY: 1.8 CM2/M2
ECHO AV PEAK GRADIENT: 13.9 MMHG
ECHO AV PEAK VELOCITY: 186.47 CM/S
ECHO IVC SNIFF: 1.99 CM
ECHO LA MAJOR AXIS: 3.76 CM
ECHO LA TO AORTIC ROOT RATIO: 1.1
ECHO LV EDV A2C: 107 ML
ECHO LV EDV A4C: 135.4 ML
ECHO LV EDV BP: 123.1 ML (ref 67–155)
ECHO LV EDV INDEX A4C: 54.8 ML/M2
ECHO LV EDV INDEX BP: 49.9 ML/M2
ECHO LV EDV NDEX A2C: 43.3 ML/M2
ECHO LV EDV TEICHHOLZ: 0.34 ML
ECHO LV EJECTION FRACTION A2C: 62 %
ECHO LV EJECTION FRACTION A4C: 58 %
ECHO LV EJECTION FRACTION BIPLANE: 58.9 % (ref 55–100)
ECHO LV ESV A2C: 40.9 ML
ECHO LV ESV A4C: 57 ML
ECHO LV ESV BP: 50.6 ML (ref 22–58)
ECHO LV ESV INDEX A2C: 16.6 ML/M2
ECHO LV ESV INDEX A4C: 23.1 ML/M2
ECHO LV ESV INDEX BP: 20.5 ML/M2
ECHO LV ESV TEICHHOLZ: 0.36 ML
ECHO LV INTERNAL DIMENSION DIASTOLIC: 3.76 CM (ref 4.2–5.9)
ECHO LV INTERNAL DIMENSION SYSTOLIC: 3.86 CM
ECHO LV IVSD: 1.14 CM (ref 0.6–1)
ECHO LV MASS 2D: 148.3 G (ref 88–224)
ECHO LV MASS INDEX 2D: 60.1 G/M2 (ref 49–115)
ECHO LV POSTERIOR WALL DIASTOLIC: 1.03 CM (ref 0.6–1)
ECHO LVOT DIAM: 2.59 CM
ECHO LVOT PEAK GRADIENT: 10.9 MMHG
ECHO LVOT PEAK VELOCITY: 165.03 CM/S
ECHO LVOT SV: 143.9 ML
ECHO LVOT VTI: 27.32 CM
ECHO MV A VELOCITY: 94.5 CM/S
ECHO MV E DECELERATION TIME (DT): 212.8 MS
ECHO MV E VELOCITY: 60.29 CM/S
ECHO MV E/A RATIO: 0.64
ECHO RA MINOR AXIS: 3.98 CM
LVFS 2D: -2.45 %
LVOT MG: 6.03 MMHG
LVOT MV: 1.12 CM/S
LVSV (MOD BI): 28.08 ML
LVSV (MOD SINGLE 4C): 30.41 ML
LVSV (MOD SINGLE): 25.63 ML
LVSV (TEICH): -1.39 ML
MV DEC SLOPE: 2.83

## 2019-12-24 PROCEDURE — 93226 XTRNL ECG REC<48 HR SCAN A/R: CPT

## 2019-12-24 PROCEDURE — 93306 TTE W/DOPPLER COMPLETE: CPT

## 2020-01-09 ENCOUNTER — HOSPITAL ENCOUNTER (OUTPATIENT)
Dept: LAB | Age: 60
Discharge: HOME | End: 2020-01-09
Payer: OTHER GOVERNMENT

## 2020-01-09 LAB
ANION GAP SERPL CALC-SCNC: 3 MMOL/L (ref 3–18)
BUN SERPL-MCNC: 16 MG/DL (ref 7–18)
BUN/CREAT SERPL: 21 (ref 12–20)
CALCIUM SERPL-MCNC: 9.2 MG/DL (ref 8.5–10.1)
CHLORIDE SERPL-SCNC: 106 MMOL/L (ref 100–111)
CO2 SERPL-SCNC: 33 MMOL/L (ref 21–32)
CREAT SERPL-MCNC: 0.77 MG/DL (ref 0.6–1.3)
GLUCOSE SERPL-MCNC: 97 MG/DL (ref 74–99)
POTASSIUM SERPL-SCNC: 4.3 MMOL/L (ref 3.5–5.5)
SODIUM SERPL-SCNC: 142 MMOL/L (ref 136–145)

## 2020-01-09 PROCEDURE — 36415 COLL VENOUS BLD VENIPUNCTURE: CPT

## 2020-01-09 PROCEDURE — 80048 BASIC METABOLIC PNL TOTAL CA: CPT

## 2020-03-09 RX ORDER — SIMVASTATIN 20 MG/1
TABLET, FILM COATED ORAL
Qty: 90 TAB | Refills: 0 | Status: SHIPPED | OUTPATIENT
Start: 2020-03-09 | End: 2020-07-01

## 2020-06-16 RX ORDER — CHLORTHALIDONE 25 MG/1
TABLET ORAL
Qty: 30 TAB | Refills: 5 | Status: SHIPPED | OUTPATIENT
Start: 2020-06-16 | End: 2021-01-18

## 2020-07-01 RX ORDER — SIMVASTATIN 20 MG/1
TABLET, FILM COATED ORAL
Qty: 90 TAB | Refills: 0 | Status: SHIPPED | OUTPATIENT
Start: 2020-07-01 | End: 2020-08-19 | Stop reason: SDUPTHER

## 2020-08-19 ENCOUNTER — OFFICE VISIT (OUTPATIENT)
Dept: CARDIOLOGY CLINIC | Age: 60
End: 2020-08-19

## 2020-08-19 VITALS
RESPIRATION RATE: 18 BRPM | BODY MASS INDEX: 42.33 KG/M2 | WEIGHT: 295 LBS | DIASTOLIC BLOOD PRESSURE: 83 MMHG | OXYGEN SATURATION: 97 % | SYSTOLIC BLOOD PRESSURE: 138 MMHG | TEMPERATURE: 98.1 F | HEART RATE: 98 BPM

## 2020-08-19 DIAGNOSIS — R00.2 PALPITATION: Primary | ICD-10-CM

## 2020-08-19 RX ORDER — NITROGLYCERIN 0.4 MG/1
0.4 TABLET SUBLINGUAL
Qty: 1 BOTTLE | Refills: 2 | Status: SHIPPED | OUTPATIENT
Start: 2020-08-19

## 2020-08-19 RX ORDER — SIMVASTATIN 20 MG/1
20 TABLET, FILM COATED ORAL
Qty: 90 TAB | Refills: 3 | Status: SHIPPED | OUTPATIENT
Start: 2020-08-19 | End: 2020-10-16

## 2020-08-19 NOTE — PATIENT INSTRUCTIONS
Testing 48 HR Holter Please call [facility]'s central scheduling at [phone]  to schedule an appointment. All testing is completed at [address] [facility], [facility] [address] **call office three days after testing for results**

## 2020-08-19 NOTE — PROGRESS NOTES
Miroslava Plummer is a 61 y.o. male with a history of borderline hypertension, hyperlipidemia and sleep apnea. He also has obesity. Mr. Jackie Stovall is here today for follow up appointment. Since last visit, patient denies any hospital admission or ER visit. He does not have any symptoms concerning for palpitation, presyncope or syncope. He does have dyspnea with moderate exertion, unchanged. He was able to lose about 20 pounds however he has gained all that back because of COVID-19 pandemic situation has not been able to go to gym. He denies PND or lower extremity swelling. He denies chest pain or chest tightness. Past Medical History:  Past Medical History:   Diagnosis Date    Borderline hypertension     GERD (gastroesophageal reflux disease)     Hyperlipidemia     Obesity     JONNATHAN (obstructive sleep apnea)     S/P cardiac cath 12/15    No evidence of CAD     Surgical History:  Past Surgical History:   Procedure Laterality Date    HX HEMORRHOIDECTOMY  1990    HX MOHS PROCEDURES  2006    HX TONSILLECTOMY  1960s    UPPER ARM/ELBOW SURGERY UNLISTED       Current Meds:   Current Outpatient Medications   Medication Sig Dispense Refill    simvastatin (ZOCOR) 20 mg tablet TAKE 1 TABLET BY MOUTH AT NIGHT 90 Tab 0    chlorthalidone (HYGROTEN) 25 mg tablet TAKE 1 TABLET BY MOUTH DAILY 30 Tab 5    fluticasone (FLONASE) 50 mcg/actuation nasal spray nightly.  montelukast (SINGULAIR) 10 mg tablet Take 10 mg by mouth daily.  nitroglycerin (NITROSTAT) 0.4 mg SL tablet 1 Tab by SubLINGual route every five (5) minutes as needed for Chest Pain. 1 Bottle 2    multivitamins-minerals-lutein (CENTRUM SILVER) Tab Take  by mouth.  aspirin 81 mg tablet Take  by mouth.        Social History:  Social History     Tobacco Use    Smoking status: Never Smoker    Smokeless tobacco: Never Used   Substance Use Topics    Alcohol use: No    Drug use: No     BP Readings from Last 3 Encounters:   08/19/20 138/83 12/24/19 134/85   12/10/19 134/85     Pulse Readings from Last 3 Encounters:   08/19/20 98   12/10/19 85   07/09/18 78     Wt Readings from Last 3 Encounters:   08/19/20 295 lb (133.8 kg)   12/24/19 297 lb (134.7 kg)   12/10/19 297 lb (134.7 kg)     Physical Exam   Constitutional: He is oriented to person, place, and time. He appears well-developed. HENT: Head: Normocephalic and atraumatic. Neck: No JVD present. Cardiovascular: Regular rhythm. No extrasystoles are present. Exam reveals no gallop and no friction rub. No murmur heard. Pulmonary/Chest: Breath sounds normal. No respiratory distress. He has no wheezes. He has no rales. Abdominal: No tenderness. He has no rebound and no guarding. Musculoskeletal: He exhibits 1+ edema. REVIEW OF DATA:    Last Lipids  Lab Results   Component Value Date/Time    Cholesterol, total 131 06/12/2018 12:09 PM    HDL Cholesterol 24 (L) 06/12/2018 12:09 PM    LDL, calculated 67.6 06/12/2018 12:09 PM    Triglyceride 197 (H) 06/12/2018 12:09 PM    CHOL/HDL Ratio 5.5 (H) 06/12/2018 12:09 PM      FLP   (09/2013) , , HDL 31, LDL 70  (12/2015)     TC  122, , HDL 31, LDL 64    Cardiac catheterization(06/2010)   mild luminal irregularities of all three major epicardial arteries. No significant stenosis. LV ejection fraction was 55%. CARDIAC CATH (12/15)  LV Function: 60 % . No MR. No AS. LVEDP: 11 mm Hg   RCA: Large, distally divided to PL and PDA branches and they are all patent  Left main: Normal  LAD: LAD and Diagonal, good caliber and patent  Circumflex: C0-dominant, large OM1, fair posterior circumflex, and they are all patent. Holter Monitor (12/19)  Mauricio Hunter was in Sinus. The average heart rate, excluding ectopy, was 88 BPM with a minimum of 66 BPM at 07:32 D2 and a maximum of 128 BPM at 14:34 D1. Heart beats, including ectopy, totaled 845422 beats.   VENTRICULAR ECTOPICS totaled 63240 averaging 471.9 per hour with 59708 single, 2 paired, 572 trigeminy and 0 R on T. BIGEMINY runs  occurred 605 times and totaled 2663 beats. PVC [place] 8.6%  SUPRAVENTRICULAR ECTOPICS totaled 5 ,with 5 single and 0 paired beats. Patient diary was submitted, no symptoms noted. No patient triggered events noted. ECHO (12/19)  Left Ventricle  Normal cavity size, wall thickness and systolic function (ejection fraction normal). The estimated ejection fraction is 55 - 60%. Visually measured ejection fraction. No regional wall motion abnormality noted. There is age-appropriate left ventricular diastolic function. Wall Scoring  The left ventricular wall motion is normal.             Left Atrium  Normal cavity size. Left Atrium volume index is 25.28 mL/m2. Right Ventricle  Normal cavity size and global systolic function. Right Atrium  Normal cavity size. Aortic Valve  Normal valve structure, no stenosis and no regurgitation. Mitral Valve  Normal valve structure, no stenosis and no regurgitation. Tricuspid Valve  Normal valve structure and no stenosis. Tricuspid regurgitation is inadequate for estimation of right ventricular systolic pressure. There is no evidence of pulmonary hypertension. Pulmonic Valve  Normal valve structure and no stenosis. Trace regurgitation. ASSESSMENT and PLAN  Mr. Amanda Kaba is a pleasant 61 y.o.  gentleman with a history of hypertension, hyperlipidemia, and sleep apnea. Hypertension: /85 mmHg  Continue current medication. Hyperlipidemia:  Last lipid profile showed LDL level of 67. Continue simvastatin. Sleep apnea:  He uses CPAP machine. Obesity:  His weight today is 254--->297 lbs. he was able to lose 20 pounds however has gained all the weight back since COVID-19 pandemic situation and not being able to go to gym. He is going to figure out a way to perform more regular exercise to lose weight.     Palpitation:  No palpitations since last visit  Holter in December 19 with PVC burden of 8.6%. Denies presyncope or syncope. Examined heart today and I was not able to appreciate any PVCs for almost 1 minute  Will repeat Holter again. Echo with normal EF    Thank you for allowing me to participate in the patient's care. Feel free to call me if you have any questions or concerns.

## 2020-08-31 ENCOUNTER — HOSPITAL ENCOUNTER (OUTPATIENT)
Dept: NON INVASIVE DIAGNOSTICS | Age: 60
Discharge: HOME | End: 2020-08-31
Attending: INTERNAL MEDICINE
Payer: OTHER GOVERNMENT

## 2020-08-31 DIAGNOSIS — R00.2 PALPITATION: ICD-10-CM

## 2020-08-31 PROCEDURE — 93226 XTRNL ECG REC<48 HR SCAN A/R: CPT

## 2020-10-16 RX ORDER — SIMVASTATIN 20 MG/1
TABLET, FILM COATED ORAL
Qty: 90 TAB | Refills: 3 | Status: SHIPPED | OUTPATIENT
Start: 2020-10-16 | End: 2021-11-04 | Stop reason: SDUPTHER

## 2021-01-18 RX ORDER — CHLORTHALIDONE 25 MG/1
TABLET ORAL
Qty: 30 TAB | Refills: 5 | Status: SHIPPED | OUTPATIENT
Start: 2021-01-18 | End: 2021-06-07

## 2021-06-07 RX ORDER — CHLORTHALIDONE 25 MG/1
TABLET ORAL
Qty: 30 TABLET | Refills: 5 | Status: SHIPPED | OUTPATIENT
Start: 2021-06-07 | End: 2021-12-20

## 2021-11-04 RX ORDER — SIMVASTATIN 20 MG/1
TABLET, FILM COATED ORAL
Qty: 60 TABLET | Refills: 0 | Status: SHIPPED | OUTPATIENT
Start: 2021-11-04 | End: 2021-11-08

## 2021-11-04 NOTE — TELEPHONE ENCOUNTER
PCP: Anel Prieto MD    Last appt: Visit date not found  Future Appointments   Date Time Provider Mian Cantrell   12/28/2021  8:15 AM Gabo Gil MD [facility] AMB       Requested Prescriptions     Pending Prescriptions Disp Refills    simvastatin (ZOCOR) 20 mg tablet 90 Tablet 3

## 2021-11-04 NOTE — TELEPHONE ENCOUNTER
Requested Prescriptions     Pending Prescriptions Disp Refills    simvastatin (ZOCOR) 20 mg tablet 90 Tablet 3     Patient has appointment 12/28

## 2021-11-08 RX ORDER — SIMVASTATIN 20 MG/1
TABLET, FILM COATED ORAL
Qty: 90 TABLET | Refills: 3 | Status: SHIPPED | OUTPATIENT
Start: 2021-11-08

## 2021-12-20 RX ORDER — CHLORTHALIDONE 25 MG/1
TABLET ORAL
Qty: 30 TABLET | Refills: 5 | Status: SHIPPED | OUTPATIENT
Start: 2021-12-20 | End: 2022-06-22

## 2021-12-28 ENCOUNTER — OFFICE VISIT (OUTPATIENT)
Dept: CARDIOLOGY CLINIC | Age: 61
End: 2021-12-28
Payer: OTHER GOVERNMENT

## 2021-12-28 VITALS
HEART RATE: 87 BPM | OXYGEN SATURATION: 97 % | DIASTOLIC BLOOD PRESSURE: 76 MMHG | SYSTOLIC BLOOD PRESSURE: 122 MMHG | TEMPERATURE: 97.6 F | WEIGHT: 280 LBS | RESPIRATION RATE: 18 BRPM | BODY MASS INDEX: 40.18 KG/M2

## 2021-12-28 DIAGNOSIS — R00.2 PALPITATION: ICD-10-CM

## 2021-12-28 DIAGNOSIS — I47.1 SVT (SUPRAVENTRICULAR TACHYCARDIA) (HCC): Primary | ICD-10-CM

## 2021-12-28 DIAGNOSIS — I10 HYPERTENSION, UNSPECIFIED TYPE: ICD-10-CM

## 2021-12-28 PROCEDURE — 93000 ELECTROCARDIOGRAM COMPLETE: CPT | Performed by: INTERNAL MEDICINE

## 2021-12-28 PROCEDURE — 99214 OFFICE O/P EST MOD 30 MIN: CPT | Performed by: INTERNAL MEDICINE

## 2021-12-28 RX ORDER — METOPROLOL SUCCINATE 25 MG/1
25 TABLET, EXTENDED RELEASE ORAL DAILY
Qty: 90 TABLET | Refills: 3 | Status: SHIPPED | OUTPATIENT
Start: 2021-12-28

## 2021-12-28 NOTE — PROGRESS NOTES
Naldo Landry is a 64 y.o. male with a history of borderline hypertension, hyperlipidemia and sleep apnea. He also has obesity. Mr. Jas Arteaga is here today for follow up appointment. Since last visit, patient denies any hospital admission or ER visit. He denies any symptoms any suggestive of heart failure. He describes some infrequent sensation of pressure feeling in the chest associated with some palpitation. The symptoms are infrequent and last usually less than a minute. There is no nausea vomiting or diaphoresis. This happens usually when he is rest and at evening time. During the daytime with activity he does not have any symptoms. He has not used any nitroglycerin. There is no radiation. He feels like this may be a palpitation causing some pressure feeling. Past Medical History:  Past Medical History:   Diagnosis Date    Borderline hypertension     GERD (gastroesophageal reflux disease)     Hyperlipidemia     Obesity     JONNATHAN (obstructive sleep apnea)     S/P cardiac cath 12/15    No evidence of CAD     Surgical History:  Past Surgical History:   Procedure Laterality Date    HX HEMORRHOIDECTOMY  1990    HX MOHS PROCEDURES  2006    HX TONSILLECTOMY  1960s    UPPER ARM/ELBOW SURGERY UNLISTED       Current Meds:   Current Outpatient Medications   Medication Sig Dispense Refill    chlorthalidone (HYGROTON) 25 mg tablet TAKE 1 TABLET BY MOUTH DAILY 30 Tablet 5    simvastatin (ZOCOR) 20 mg tablet TAKE 1 TABLET BY MOUTH AT NIGHT 90 Tablet 3    nitroglycerin (NITROSTAT) 0.4 mg SL tablet 1 Tab by SubLINGual route every five (5) minutes as needed for Chest Pain. 1 Bottle 2    fluticasone (FLONASE) 50 mcg/actuation nasal spray nightly.  montelukast (SINGULAIR) 10 mg tablet Take 10 mg by mouth daily.  multivitamins-minerals-lutein (CENTRUM SILVER) Tab Take  by mouth.  aspirin 81 mg tablet Take  by mouth.        Social History:  Social History     Tobacco Use    Smoking status: Never Smoker    Smokeless tobacco: Never Used   Substance Use Topics    Alcohol use: No    Drug use: No     BP Readings from Last 3 Encounters:   08/19/20 138/83   12/24/19 134/85   12/10/19 134/85     Pulse Readings from Last 3 Encounters:   08/19/20 98   12/10/19 85   07/09/18 78     Wt Readings from Last 3 Encounters:   08/19/20 133.8 kg (295 lb)   12/24/19 134.7 kg (297 lb)   12/10/19 134.7 kg (297 lb)     Physical Exam   Constitutional: He is oriented to person, place, and time. He appears well-developed. HENT: Head: Normocephalic and atraumatic. Neck: No JVD present. Cardiovascular: Regular rhythm. No extrasystoles are present. Exam reveals no gallop and no friction rub. No murmur heard. Pulmonary/Chest: Breath sounds normal. No respiratory distress. He has no wheezes. He has no rales. Abdominal: No tenderness. He has no rebound and no guarding. Musculoskeletal: He exhibits 1+ edema. REVIEW OF DATA:    Last Lipids  Lab Results   Component Value Date/Time    Cholesterol, total 131 06/12/2018 12:09 PM    HDL Cholesterol 24 (L) 06/12/2018 12:09 PM    LDL, calculated 67.6 06/12/2018 12:09 PM    Triglyceride 197 (H) 06/12/2018 12:09 PM    CHOL/HDL Ratio 5.5 (H) 06/12/2018 12:09 PM      FLP   (09/2013) , , HDL 31, LDL 70  (12/2015)     TC  122, , HDL 31, LDL 64    Cardiac catheterization(06/2010)   mild luminal irregularities of all three major epicardial arteries. No significant stenosis. LV ejection fraction was 55%. CARDIAC CATH (12/15)  LV Function: 60 % . No MR. No AS. LVEDP: 11 mm Hg   RCA: Large, distally divided to PL and PDA branches and they are all patent  Left main: Normal  LAD: LAD and Diagonal, good caliber and patent  Circumflex: C0-dominant, large OM1, fair posterior circumflex, and they are all patent. Holter Monitor (12/19)  Cassidy Rubin was in Sinus.   The average heart rate, excluding ectopy, was 88 BPM with a minimum of 66 BPM at 07:32 D2 and a maximum of 128 BPM at 14:34 D1. Heart beats, including ectopy, totaled 704238 beats. VENTRICULAR ECTOPICS totaled 98899 averaging 471.9 per hour with 96607 single, 2 paired, 572 trigeminy and 0 R on T. BIGEMINY runs  occurred 605 times and totaled 2663 beats. PVC [place] 8.6%  SUPRAVENTRICULAR ECTOPICS totaled 5 ,with 5 single and 0 paired beats. Patient diary was submitted, no symptoms noted. No patient triggered events noted. ECHO (12/19)  Left Ventricle  Normal cavity size, wall thickness and systolic function (ejection fraction normal). The estimated ejection fraction is 55 - 60%. Visually measured ejection fraction. No regional wall motion abnormality noted. There is age-appropriate left ventricular diastolic function. Wall Scoring  The left ventricular wall motion is normal.             Left Atrium  Normal cavity size. Left Atrium volume index is 25.28 mL/m2. Right Ventricle  Normal cavity size and global systolic function. Right Atrium  Normal cavity size. Aortic Valve  Normal valve structure, no stenosis and no regurgitation. Mitral Valve  Normal valve structure, no stenosis and no regurgitation. Tricuspid Valve  Normal valve structure and no stenosis. Tricuspid regurgitation is inadequate for estimation of right ventricular systolic pressure. There is no evidence of pulmonary hypertension. Pulmonic Valve  Normal valve structure and no stenosis. Trace regurgitation. ASSESSMENT and PLAN  Mr. Gracy Dunaway is a pleasant 64 y.o.  gentleman with a history of hypertension, hyperlipidemia, and sleep apnea. Hypertension: /76. Currently he is on chlorthalidone 25 mg daily. Starting Toprol-XL 25 mg daily for palpitation and brief burst of symptomatic narrow complex tachycardia on Holter monitor from 2020    Hyperlipidemia:  Last lipid profile showed LDL level of 67. Continue simvastatin. Sleep apnea:  He uses CPAP machine.     Obesity:  His weight today is 254--->297-->280lbs. Palpitation /chest discomfort:  Holter monitor in 2020 showed infrequent burst of narrow complex tachycardia at 160 bpm.  Most likely symptoms are consistent with symptomatic narrow complex tachycardia  Holter in December 2019 with PVC burden of 8.6%. Repeat Holter in 2020 without any excessive PVC burden  We will start patient on Toprol-XL 25 mg daily. BP cuff has been ordered. He was asked to check blood pressure at home regularly. If symptoms does not improve, would consider coronary evaluation with stress test even though symptoms are not consistent with angina. Echo to evaluate cardiac structure with these symptoms. Thank you for allowing me to participate in the patient's care. Feel free to call me if you have any questions or concerns.

## 2021-12-28 NOTE — LETTER
12/28/2021    Patient: Gilford Coats   YOB: 1960   Date of Visit: 12/28/2021     Weston Rae MD  [address] [facility] [address]  Via Fax: [phone]    Dear Weston Rae MD,      Thank you for referring Mr. Gilford Coats to [facility] for evaluation. My notes for this consultation are attached. If you have questions, please do not hesitate to call me. I look forward to following your patient along with you.       Sincerely,    Tanner Perez MD

## 2021-12-28 NOTE — PATIENT INSTRUCTIONS
Testing   Echo- limited    **call office 3-5 days after testing is completed for results**     New Medication/Medication Changes  toprol 25 mg XL  **please allow 24-48 hrs for medication to be escribed to pharmacy** If you need any refills on medications please contact your pharmacy so that the request can be escribed to the provider for review.       Other Testing  2 week BP Check  DME BP cuff paper given to patient

## 2021-12-28 NOTE — PROGRESS NOTES
Identified pt with two pt identifiers(name and ). Reviewed record in preparation for visit and have obtained necessary documentation. Dickson Matthews presents today for   Chief Complaint   Patient presents with    Follow-up     overdue       DIZZINESS: Y  SOB: N  IRREGULAR HEARTBEATS: N- chest pressure while lying  FATIGUE/WEAKNESS: N  HEADACHESN  SWELLING:N            LOCATION: N/A    Dickson Matthews preferred language for health care discussion is english/other. Personal Protective Equipment:   Personal Protective Equipment was used including: mask-surgical and hands-gloves. Patient was placed on no precaution(s). Patient was masked. Precautions:   Patient currently on None  Patient currently roomed with door closed    Is someone accompanying this pt? no  Is the patient using any DME equipment during [address]? no    Depression Screening:  3 most recent PHQ Screens 2018   Little interest or pleasure in doing things Not at all   Feeling down, depressed, irritable, or hopeless Not at all   Total Score PHQ 2 0       Learning Assessment:  Learning Assessment 2016   PRIMARY LEARNER Patient   PRIMARY LANGUAGE ENGLISH   LEARNER PREFERENCE PRIMARY DEMONSTRATION   ANSWERED BY pt   RELATIONSHIP SELF       Abuse Screening:  No flowsheet data found. Fall Risk  No flowsheet data found. Pt currently taking Anticoagulant therapy? no    Coordination of Care:  1. Have you been to the ER, urgent care clinic since your last visit? Hospitalized since your last visit? no    2. Have you seen or consulted any other health care providers outside of the [address] [place] [address] since your last visit? Include any pap smears or colon screening. yes      Please see Red banners under Allergies and Med Rec to remove outside inquires. All correct information has been verified with patient and added to chart.      Medication's patient's would liked removed has been marked not taking to be removed per Verbal order and read back per Marietta Pollock MD

## 2022-01-10 ENCOUNTER — CLINICAL SUPPORT (OUTPATIENT)
Dept: CARDIOLOGY CLINIC | Age: 62
End: 2022-01-10

## 2022-01-10 VITALS — HEART RATE: 76 BPM | SYSTOLIC BLOOD PRESSURE: 120 MMHG | OXYGEN SATURATION: 96 % | DIASTOLIC BLOOD PRESSURE: 80 MMHG

## 2022-01-10 DIAGNOSIS — Z01.30 BLOOD PRESSURE CHECK: Primary | ICD-10-CM

## 2022-02-14 ENCOUNTER — TELEPHONE (OUTPATIENT)
Dept: CARDIOLOGY CLINIC | Age: 62
End: 2022-02-14

## 2022-02-17 ENCOUNTER — TELEPHONE (OUTPATIENT)
Dept: CARDIOLOGY CLINIC | Age: 62
End: 2022-02-17

## 2022-02-17 NOTE — TELEPHONE ENCOUNTER
----- Message from Tasha Elena MD sent at 2/17/2022  1:12 PM EST -----  Cardiac testing appears normal.  Inform patient please    Thanks  SP

## 2022-03-29 ENCOUNTER — OFFICE VISIT (OUTPATIENT)
Dept: CARDIOLOGY CLINIC | Age: 62
End: 2022-03-29
Payer: OTHER GOVERNMENT

## 2022-03-29 VITALS
HEART RATE: 76 BPM | RESPIRATION RATE: 16 BRPM | TEMPERATURE: 97.8 F | SYSTOLIC BLOOD PRESSURE: 126 MMHG | DIASTOLIC BLOOD PRESSURE: 76 MMHG | BODY MASS INDEX: 40.89 KG/M2 | WEIGHT: 285 LBS | OXYGEN SATURATION: 97 %

## 2022-03-29 DIAGNOSIS — I47.1 SVT (SUPRAVENTRICULAR TACHYCARDIA) (HCC): Primary | ICD-10-CM

## 2022-03-29 DIAGNOSIS — E66.01 MORBID OBESITY, UNSPECIFIED OBESITY TYPE (HCC): ICD-10-CM

## 2022-03-29 DIAGNOSIS — I10 ESSENTIAL HYPERTENSION WITH GOAL BLOOD PRESSURE LESS THAN 140/90: ICD-10-CM

## 2022-03-29 PROCEDURE — 99213 OFFICE O/P EST LOW 20 MIN: CPT | Performed by: INTERNAL MEDICINE

## 2022-03-29 NOTE — PROGRESS NOTES
Eric Mane is a 58 y.o. male with a history of borderline hypertension, hyperlipidemia and sleep apnea. He also has obesity. Mr. Shana Ferro is here today for follow up appointment. Since last visit, patient denies any hospital admission or ER visit. He denies any symptoms any suggestive of heart failure. Since last visit, patient has been feeling much better. He has no chest pain or chest tightness. He denies any significant palpitation. He is taking his Toprol without any side effect. No presyncope or syncope    Past Medical History:  Past Medical History:   Diagnosis Date    Borderline hypertension     GERD (gastroesophageal reflux disease)     Hyperlipidemia     Obesity     JONNATHAN (obstructive sleep apnea)     S/P cardiac cath 12/15    No evidence of CAD     Surgical History:  Past Surgical History:   Procedure Laterality Date    HX HEMORRHOIDECTOMY  1990    HX MOHS PROCEDURES  2006    HX TONSILLECTOMY  1960s    UPPER ARM/ELBOW SURGERY UNLISTED       Current Meds:   Current Outpatient Medications   Medication Sig Dispense Refill    metoprolol succinate (TOPROL-XL) 25 mg XL tablet Take 1 Tablet by mouth daily. 90 Tablet 3    chlorthalidone (HYGROTON) 25 mg tablet TAKE 1 TABLET BY MOUTH DAILY 30 Tablet 5    simvastatin (ZOCOR) 20 mg tablet TAKE 1 TABLET BY MOUTH AT NIGHT 90 Tablet 3    nitroglycerin (NITROSTAT) 0.4 mg SL tablet 1 Tab by SubLINGual route every five (5) minutes as needed for Chest Pain. 1 Bottle 2    aspirin 81 mg tablet Take  by mouth.  fluticasone (FLONASE) 50 mcg/actuation nasal spray nightly.  montelukast (SINGULAIR) 10 mg tablet Take 10 mg by mouth daily.  multivitamins-minerals-lutein (CENTRUM SILVER) Tab Take  by mouth.        Social History:  Social History     Tobacco Use    Smoking status: Never Smoker    Smokeless tobacco: Never Used   Substance Use Topics    Alcohol use: No    Drug use: No     BP Readings from Last 3 Encounters:   02/15/22 133/80   01/10/22 120/80   12/28/21 122/76     Pulse Readings from Last 3 Encounters:   01/10/22 76   12/28/21 87   08/19/20 98     Wt Readings from Last 3 Encounters:   02/15/22 127 kg (280 lb)   12/28/21 127 kg (280 lb)   08/19/20 133.8 kg (295 lb)     Physical Exam   Constitutional: He is oriented to person, place, and time. He appears well-developed. HENT: Head: Normocephalic and atraumatic. Neck: No JVD present. Cardiovascular: Regular rhythm. No extrasystoles are present. Exam reveals no gallop and no friction rub. No murmur heard. Pulmonary/Chest: Breath sounds normal. No respiratory distress. He has no wheezes. He has no rales. Abdominal: No tenderness. He has no rebound and no guarding. Musculoskeletal: He exhibits 1+ edema. REVIEW OF DATA:    Last Lipids  Lab Results   Component Value Date/Time    Cholesterol, total 131 06/12/2018 12:09 PM    HDL Cholesterol 24 (L) 06/12/2018 12:09 PM    LDL, calculated 67.6 06/12/2018 12:09 PM    Triglyceride 197 (H) 06/12/2018 12:09 PM    CHOL/HDL Ratio 5.5 (H) 06/12/2018 12:09 PM      FLP   (09/2013) , , HDL 31, LDL 70  (12/2015)     TC  122, , HDL 31, LDL 64    Cardiac catheterization(06/2010)   mild luminal irregularities of all three major epicardial arteries. No significant stenosis. LV ejection fraction was 55%. CARDIAC CATH (12/15)  LV Function: 60 % . No MR. No AS. LVEDP: 11 mm Hg   RCA: Large, distally divided to PL and PDA branches and they are all patent  Left main: Normal  LAD: LAD and Diagonal, good caliber and patent  Circumflex: C0-dominant, large OM1, fair posterior circumflex, and they are all patent. Holter Monitor (12/19)  Brett Salazar was in Sinus. The average heart rate, excluding ectopy, was 88 BPM with a minimum of 66 BPM at 07:32 D2 and a maximum of 128 BPM at 14:34 D1. Heart beats, including ectopy, totaled 047765 beats.   VENTRICULAR ECTOPICS totaled 11893 averaging 471.9 per hour with 37634 single, 2 paired, 572 trigeminy and 0 R on T. BIGEMINY runs  occurred 605 times and totaled 2663 beats. PVC [place] 8.6%  SUPRAVENTRICULAR ECTOPICS totaled 5 ,with 5 single and 0 paired beats. Patient diary was submitted, no symptoms noted. No patient triggered events noted. ECHO (02/22)  Left Ventricle Left ventricle size is normal. Unable to assess wall thickness. Normal wall motion. Normal left ventricular systolic function with a visually estimated EF of 55 - 60%. Diastolic dysfunction present with normal LV EF. Left Atrium Left atrium is mildly dilated. Left atrial volume index is mildly increased (35-41 mL/m2). Right Ventricle Right ventricle size is normal. Normal systolic function. TAPSE is normal.   Right Atrium Right atrium size is normal.   Aortic Valve Valve structure is normal. Trace transvalvular regurgitation. No stenosis. Mitral Valve Mildly thickened leaflets. Trace transvalvular regurgitation. No stenosis. Tricuspid Valve Valve structure is normal. Trace transvalvular regurgitation. No stenosis. Pulmonic Valve Valve structure is normal. Trace transvalvular regurgitation. No stenosis. Aorta Normal sized ascending aorta and aortic arch. IVC/Hepatic Veins IVC diameter is greater than 21 mm and decreases greater than 50% during inspiration; therefore the estimated right atrial pressure is intermediate (~8 mmHg). ASSESSMENT and PLAN  Mr. Jocelyn Barnett is a pleasant 58 y.o.  gentleman with a history of hypertension, hyperlipidemia, and sleep apnea. Hypertension: /76. Currently he is on chlorthalidone 25 mg daily and Toprol-XL 25 mg daily. Hyperlipidemia:  Last lipid profile showed LDL level of 67 in 2018. Continue simvastatin. PCP checks this regularly per patient. Defer to PCP    Sleep apnea:  He uses CPAP machine. Obesity:  His weight today is 254--->297-->285 lbs.    Dose of diet and exercise discussed with the patient again during this visit    Thank you for allowing me to participate in the patient's care. Feel free to call me if you have any questions or concerns.

## 2022-03-29 NOTE — PROGRESS NOTES
Identified pt with two pt identifiers(name and ). Reviewed record in preparation for visit and have obtained necessary documentation. Yanet Bowens presents today for   Chief Complaint   Patient presents with    Follow-up     3m       Pt denies DIZZINESS, SOB, CHEST PAIN/ PRESSURE, FATIGUE/WEAKNESS, HEADACHES, SWELLING. Yanet Bowens preferred language for health care discussion is english/other. Personal Protective Equipment:   Personal Protective Equipment was used including: mask-surgical and hands-gloves. Patient was placed on no precaution(s). Patient was masked. Precautions:   Patient currently on None  Patient currently roomed with door closed. Is someone accompanying this pt? no    Is the patient using any DME equipment during [address]? no  Depression Screening:  3 most recent PHQ Screens 3/29/2022   Little interest or pleasure in doing things Not at all   Feeling down, depressed, irritable, or hopeless Not at all   Total Score PHQ 2 0       Learning Assessment:  Learning Assessment 2016   PRIMARY LEARNER Patient   PRIMARY LANGUAGE ENGLISH   LEARNER PREFERENCE PRIMARY DEMONSTRATION   ANSWERED BY pt   RELATIONSHIP SELF       Abuse Screening:  No flowsheet data found. Fall Risk  No flowsheet data found. Pt currently taking Anticoagulant therapy? no  Pt currently taking Antiplatelet therapy? no    Coordination of Care:  1. Have you been to the ER, urgent care clinic since your last visit? Hospitalized since your last visit? no    2. Have you seen or consulted any other health care providers outside of the [address] [place] [address] since your last visit? Include any pap smears or colon screening. no      Please see Red banners under Allergies and Med Rec to remove outside inquires. All correct information has been verified with patient and added to chart.      Medication's patient's would liked removed has been marked not taking to be removed per Verbal order and read back per Nura Dima Vila MD

## 2022-06-22 RX ORDER — CHLORTHALIDONE 25 MG/1
TABLET ORAL
Qty: 30 TABLET | Refills: 5 | Status: SHIPPED | OUTPATIENT
Start: 2022-06-22

## 2022-09-15 ENCOUNTER — OFFICE VISIT (OUTPATIENT)
Dept: CARDIOLOGY CLINIC | Age: 62
End: 2022-09-15
Payer: OTHER GOVERNMENT

## 2022-09-15 VITALS
DIASTOLIC BLOOD PRESSURE: 79 MMHG | OXYGEN SATURATION: 96 % | BODY MASS INDEX: 42.76 KG/M2 | SYSTOLIC BLOOD PRESSURE: 132 MMHG | HEART RATE: 79 BPM | TEMPERATURE: 97.8 F | WEIGHT: 298 LBS

## 2022-09-15 DIAGNOSIS — R06.00 DYSPNEA, UNSPECIFIED TYPE: ICD-10-CM

## 2022-09-15 DIAGNOSIS — R60.9 EDEMA, UNSPECIFIED TYPE: ICD-10-CM

## 2022-09-15 DIAGNOSIS — J12.82 PNEUMONIA DUE TO COVID-19 VIRUS: ICD-10-CM

## 2022-09-15 DIAGNOSIS — I10 ESSENTIAL HYPERTENSION WITH GOAL BLOOD PRESSURE LESS THAN 140/90: Primary | ICD-10-CM

## 2022-09-15 DIAGNOSIS — U07.1 PNEUMONIA DUE TO COVID-19 VIRUS: ICD-10-CM

## 2022-09-15 PROCEDURE — 99214 OFFICE O/P EST MOD 30 MIN: CPT | Performed by: INTERNAL MEDICINE

## 2022-09-15 RX ORDER — FUROSEMIDE 40 MG/1
40 TABLET ORAL AS NEEDED
Qty: 60 TABLET | Refills: 3 | Status: SHIPPED | OUTPATIENT
Start: 2022-09-15

## 2022-09-15 NOTE — PATIENT INSTRUCTIONS
Testing   Echo- At Dr. Scar Velasquez office    CT of the chest without contrast      **call office 3-5 days after testing is completed for results**     New Medication/Medication Changes    Start Lasix 40mg as needed for swelling    **please allow 24-48 hrs for medication to be escribed to pharmacy** If you need any refills on medications please contact your pharmacy so that the request can be escribed to the provider for review.     Labs    BMP    No appointment required for lab work  [address] [facility] [address] , [facility]  Hours are Mon-Fri 7:00 am-3:30 pm  **closed from 12:30 pm-1pm daily**          Please call central scheduling at [phone]

## 2022-09-15 NOTE — PROGRESS NOTES
Identified pt with two pt identifiers(name and ). Reviewed record in preparation for visit and have obtained necessary documentation. Jak Amaro presents today for   Chief Complaint   Patient presents with    Follow-up       Pt c/o SOB, CHEST PAIN/ PRESSURE. Jak Amaro preferred language for health care discussion is english/other. Personal Protective Equipment:   Personal Protective Equipment was used including: mask-surgical and hands-gloves. Patient was placed on no precaution(s). Patient was masked. Precautions:   Patient currently on None  Patient currently roomed with door closed. Is someone accompanying this pt? no    Is the patient using any DME equipment during [address]? no    Depression Screening:  3 most recent PHQ Screens 9/15/2022   Little interest or pleasure in doing things Not at all   Feeling down, depressed, irritable, or hopeless Not at all   Total Score PHQ 2 0       Learning Assessment:  Learning Assessment 2016   PRIMARY LEARNER Patient   PRIMARY LANGUAGE ENGLISH   LEARNER PREFERENCE PRIMARY DEMONSTRATION   ANSWERED BY pt   RELATIONSHIP SELF       Abuse Screening:  completed    Fall Risk  Completed    Pt currently taking Anticoagulant therapy? no  Pt currently taking Antiplatelet therapy? yes    Coordination of Care:  1. Have you been to the ER, urgent care clinic since your last visit? Hospitalized since your last visit? no    2. Have you seen or consulted any other health care providers outside of the [address] [place] [address] since your last visit? Include any pap smears or colon screening. no      Please see Red banners under Allergies and Med Rec to remove outside inquires. All correct information has been verified with patient and added to chart.      Medication's patient's would liked removed has been marked not taking to be removed per Verbal order and read back per Ulices Hodge MD

## 2022-09-15 NOTE — PROGRESS NOTES
Dolores Walters is a 58 y.o. male with a history of borderline hypertension, hyperlipidemia and sleep apnea. He also has obesity. Mr. Ellen Arroyo is here today for follow up appointment for dyspnea. Patient was diagnosed with COVID-19 pneumonia in 07/2022 and he said that he was sick for almost a month having a lot of shortness of breath and coughing. Since then he feels like he has been having shortness of breath with activity of daily living. He is not able to perform exercise without shortness of breath. He has lower extremity swelling. He had 1 or 2 episode of chest pressure on the left side however it lasted only for 1 or 2-minute. No exertional chest pain or chest tightness. No nausea vomiting or diaphoresis. No radiation. He did not have to use any nitroglycerin. His main concern is dyspnea and edema. Past Medical History:  Past Medical History:   Diagnosis Date    Borderline hypertension     GERD (gastroesophageal reflux disease)     Hyperlipidemia     Obesity     JONNATHAN (obstructive sleep apnea)     S/P cardiac cath 12/15    No evidence of CAD     Surgical History:  Past Surgical History:   Procedure Laterality Date    HX HEMORRHOIDECTOMY  1990    HX MOHS PROCEDURES  2006    HX TONSILLECTOMY  1960s    UPPER ARM/ELBOW SURGERY UNLISTED       Current Meds:   Current Outpatient Medications   Medication Sig Dispense Refill    chlorthalidone (HYGROTON) 25 mg tablet TAKE 1 TABLET BY MOUTH DAILY 30 Tablet 5    metoprolol succinate (TOPROL-XL) 25 mg XL tablet Take 1 Tablet by mouth daily. 90 Tablet 3    simvastatin (ZOCOR) 20 mg tablet TAKE 1 TABLET BY MOUTH AT NIGHT 90 Tablet 3    nitroglycerin (NITROSTAT) 0.4 mg SL tablet 1 Tab by SubLINGual route every five (5) minutes as needed for Chest Pain. 1 Bottle 2    aspirin 81 mg tablet Take  by mouth. fluticasone propionate (FLONASE) 50 mcg/actuation nasal spray nightly. montelukast (SINGULAIR) 10 mg tablet Take 10 mg by mouth daily. multivitamins-minerals-lutein (MEN'S CENTRUM SILVER WITH LUTEIN) tab tablet Take  by mouth. Social History:  Social History     Tobacco Use    Smoking status: Never    Smokeless tobacco: Never   Substance Use Topics    Alcohol use: No    Drug use: No     BP Readings from Last 3 Encounters:   09/15/22 132/79   03/29/22 126/76   02/15/22 133/80     Pulse Readings from Last 3 Encounters:   09/15/22 79   03/29/22 76   01/10/22 76     Wt Readings from Last 3 Encounters:   09/15/22 135.2 kg (298 lb)   03/29/22 129.3 kg (285 lb)   02/15/22 127 kg (280 lb)     Physical Exam   Constitutional: He is oriented to person, place, and time. He appears well-developed. HENT: Head: Normocephalic and atraumatic. Neck: No JVD present. Cardiovascular: Regular rhythm. No extrasystoles are present. Exam reveals no gallop and no friction rub. No murmur heard. Pulmonary/Chest: Breath sounds normal. No respiratory distress. He has no wheezes. He has no rales. Abdominal: No tenderness. He has no rebound and no guarding. Musculoskeletal: He exhibits 1+ edema. REVIEW OF DATA:    Last Lipids  Lab Results   Component Value Date/Time    Cholesterol, total 131 06/12/2018 12:09 PM    HDL Cholesterol 24 (L) 06/12/2018 12:09 PM    LDL, calculated 67.6 06/12/2018 12:09 PM    Triglyceride 197 (H) 06/12/2018 12:09 PM    CHOL/HDL Ratio 5.5 (H) 06/12/2018 12:09 PM      FLP   (09/2013) , , HDL 31, LDL 70  (12/2015)     TC  122, , HDL 31, LDL 64    Cardiac catheterization(06/2010)   mild luminal irregularities of all three major epicardial arteries. No significant stenosis. LV ejection fraction was 55%. CARDIAC CATH (12/15)  LV Function: 60 % . No MR. No AS.   LVEDP: 11 mm Hg   RCA: Large, distally divided to PL and PDA branches and they are all patent  Left main: Normal  LAD: LAD and Diagonal, good caliber and patent  Circumflex: C0-dominant, large OM1, fair posterior circumflex, and they are all patent. Holter Monitor (12/19)  Alvaro Dyer was in Sinus. The average heart rate, excluding ectopy, was 88 BPM with a minimum of 66 BPM at 07:32 D2 and a maximum of 128 BPM at 14:34 D1. Heart beats, including ectopy, totaled 277148 beats. VENTRICULAR ECTOPICS totaled 41546 averaging 471.9 per hour with 02161 single, 2 paired, 572 trigeminy and 0 R on T. BIGEMINY runs  occurred 605 times and totaled 2663 beats. PVC [place] 8.6%  SUPRAVENTRICULAR ECTOPICS totaled 5 ,with 5 single and 0 paired beats. Patient diary was submitted, no symptoms noted. No patient triggered events noted. ECHO (02/22)  Left Ventricle Left ventricle size is normal. Unable to assess wall thickness. Normal wall motion. Normal left ventricular systolic function with a visually estimated EF of 55 - 60%. Diastolic dysfunction present with normal LV EF. Left Atrium Left atrium is mildly dilated. Left atrial volume index is mildly increased (35-41 mL/m2). Right Ventricle Right ventricle size is normal. Normal systolic function. TAPSE is normal.   Right Atrium Right atrium size is normal.   Aortic Valve Valve structure is normal. Trace transvalvular regurgitation. No stenosis. Mitral Valve Mildly thickened leaflets. Trace transvalvular regurgitation. No stenosis. Tricuspid Valve Valve structure is normal. Trace transvalvular regurgitation. No stenosis. Pulmonic Valve Valve structure is normal. Trace transvalvular regurgitation. No stenosis. Aorta Normal sized ascending aorta and aortic arch. IVC/Hepatic Veins IVC diameter is greater than 21 mm and decreases greater than 50% during inspiration; therefore the estimated right atrial pressure is intermediate (~8 mmHg). ASSESSMENT and PLAN  Mr. Clayton Veloz is a pleasant 58 y.o.  gentleman with a history of hypertension, hyperlipidemia, and sleep apnea. Hypertension: /79. Currently he is on chlorthalidone 25 mg daily and Toprol-XL 25 mg daily. Hyperlipidemia:  Continue simvastatin. PCP checks this regularly per patient. Defer to PCP    Sleep apnea:  He uses CPAP machine. Obesity:  His weight today is 254--->298 lbs. Dyspnea and edema:  Patient had a COVID-19 pneumonia and was sick for almost a month in 07/2022. Since then patient has been having edema and dyspnea. Will order echocardiogram to evaluate EF for pulmonary pressure  I will also order CT chest without contrast to evaluate any scarring or fibrosis  I am going to provide patient Lasix 40 mg daily to be taken for next 5 days and then use as needed after that. We will check BMP today. Side effect discussed  If no improvement, may need to consider doing coronary evaluation however at this time does not appear to have any significant angina. Dose of diet and exercise discussed with the patient again during this visit    Thank you for allowing me to participate in the patient's care. Feel free to call me if you have any questions or concerns.

## 2022-10-12 ENCOUNTER — HOSPITAL ENCOUNTER (OUTPATIENT)
Dept: CT IMAGING | Age: 62
Discharge: HOME | End: 2022-10-12
Attending: INTERNAL MEDICINE
Payer: OTHER GOVERNMENT

## 2022-10-12 DIAGNOSIS — U07.1 PNEUMONIA DUE TO COVID-19 VIRUS: ICD-10-CM

## 2022-10-12 DIAGNOSIS — J12.82 PNEUMONIA DUE TO COVID-19 VIRUS: ICD-10-CM

## 2022-10-12 PROCEDURE — 71250 CT THORAX DX C-: CPT

## 2022-10-19 ENCOUNTER — TELEPHONE (OUTPATIENT)
Dept: CARDIOLOGY CLINIC | Age: 62
End: 2022-10-19

## 2022-10-20 ENCOUNTER — HOSPITAL ENCOUNTER (OUTPATIENT)
Dept: LAB | Age: 62
Discharge: HOME | End: 2022-10-20
Payer: OTHER GOVERNMENT

## 2022-10-20 LAB
ANION GAP SERPL CALC-SCNC: 6 MMOL/L (ref 3–18)
BUN SERPL-MCNC: 17 MG/DL (ref 7–18)
BUN/CREAT SERPL: 23 (ref 12–20)
CALCIUM SERPL-MCNC: 9.8 MG/DL (ref 8.5–10.1)
CHLORIDE SERPL-SCNC: 105 MMOL/L (ref 100–111)
CO2 SERPL-SCNC: 30 MMOL/L (ref 21–32)
CREAT SERPL-MCNC: 0.75 MG/DL (ref 0.6–1.3)
GLUCOSE SERPL-MCNC: 106 MG/DL (ref 74–99)
POTASSIUM SERPL-SCNC: 4 MMOL/L (ref 3.5–5.5)
SODIUM SERPL-SCNC: 141 MMOL/L (ref 136–145)

## 2022-10-20 PROCEDURE — 36415 COLL VENOUS BLD VENIPUNCTURE: CPT

## 2022-10-20 PROCEDURE — 80048 BASIC METABOLIC PNL TOTAL CA: CPT

## 2022-12-27 RX ORDER — METOPROLOL SUCCINATE 25 MG/1
25 TABLET, EXTENDED RELEASE ORAL DAILY
Qty: 90 TABLET | Refills: 3 | Status: SHIPPED | OUTPATIENT
Start: 2022-12-27 | End: 2022-12-27 | Stop reason: SDUPTHER

## 2022-12-27 RX ORDER — SIMVASTATIN 20 MG/1
TABLET, FILM COATED ORAL
Qty: 90 TABLET | Refills: 3 | Status: SHIPPED | OUTPATIENT
Start: 2022-12-27

## 2022-12-29 RX ORDER — METOPROLOL SUCCINATE 25 MG/1
25 TABLET, EXTENDED RELEASE ORAL DAILY
Qty: 90 TABLET | Refills: 2 | Status: SHIPPED | OUTPATIENT
Start: 2022-12-29

## 2023-06-01 ENCOUNTER — OFFICE VISIT (OUTPATIENT)
Age: 63
End: 2023-06-01
Payer: OTHER GOVERNMENT

## 2023-06-01 VITALS
WEIGHT: 303 LBS | DIASTOLIC BLOOD PRESSURE: 84 MMHG | TEMPERATURE: 98.4 F | BODY MASS INDEX: 43.48 KG/M2 | SYSTOLIC BLOOD PRESSURE: 132 MMHG | OXYGEN SATURATION: 99 % | HEART RATE: 73 BPM

## 2023-06-01 DIAGNOSIS — N28.9 RENAL LESION: Primary | ICD-10-CM

## 2023-06-01 DIAGNOSIS — R06.00 DYSPNEA, UNSPECIFIED TYPE: Primary | ICD-10-CM

## 2023-06-01 PROCEDURE — 93000 ELECTROCARDIOGRAM COMPLETE: CPT | Performed by: INTERNAL MEDICINE

## 2023-06-01 PROCEDURE — 3079F DIAST BP 80-89 MM HG: CPT | Performed by: INTERNAL MEDICINE

## 2023-06-01 PROCEDURE — 99214 OFFICE O/P EST MOD 30 MIN: CPT | Performed by: INTERNAL MEDICINE

## 2023-06-01 PROCEDURE — 3075F SYST BP GE 130 - 139MM HG: CPT | Performed by: INTERNAL MEDICINE

## 2023-06-01 RX ORDER — ASPIRIN 81 MG/1
81 TABLET ORAL DAILY
COMMUNITY

## 2023-06-01 NOTE — PROGRESS NOTES
Identified pt with two pt identifiers(name and ). Reviewed record in preparation for visit and have obtained necessary documentation. Alyssa Beltran presents today for   Chief Complaint   Patient presents with    Follow-up     6m       Pt c/o BLE SWELLING. Alyssa Beltran preferred language for health care discussion is english/other. Personal Protective Equipment:   Personal Protective Equipment was used including: mask-surgical and hands-gloves. Patient was placed on no precaution(s). Patient was masked. Precautions:   Patient currently on None  Patient currently roomed with door closed. Is someone accompanying this pt? no    Is the patient using any DME equipment during [address]? no    Depression Screening:  PHQ-9 Questionaire 9/15/2022   Little interest or pleasure in doing things 0   Feeling down, depressed, or hopeless 0   PHQ-9 Total Score 0        Learning Assessment:  No question data found. Abuse Screening: AMB Abuse Screening 2023   Do you ever feel afraid of your partner? N   Are you in a relationship with someone who physically or mentally threatens you? N   Is it safe for you to go home? Y          Fall Risk  Fall Risk 2023   2 or more falls in past year? no   Fall with injury in past year? no         Pt currently taking Anticoagulant therapy? no  Pt currently taking Antiplatelet therapy? Aspirin 81 mg tab    Coordination of Care:  1. Have you been to the ER, urgent care clinic since your last visit? Hospitalized since your last visit? no    2. Have you seen or consulted any other health care providers outside of the [address] [place] [address] since your last visit? Include any pap smears or colon screening. no      Please see Red banners under Allergies and Med Rec to remove outside inquires. All correct information has been verified with patient and added to chart.      Medication's patient's would liked removed has been marked not taking to be removed per Verbal order and

## 2023-06-01 NOTE — PROGRESS NOTES
[facility]    Rd Ruiz is 61 y.o. male with a history of borderline hypertension, hyperlipidemia and sleep apnea. He also has obesity. Mr. Katy Berry is here today for follow up appointment for dyspnea. Patient was diagnosed with COVID-19 pneumonia in 07/2022 and he said that he was sick for almost a month having a lot of shortness of breath and coughing. He underwent echocardiogram in 10/2022 which did not show LV dysfunction or major valvular heart disease  Since last visit, patient has started doing some exercise he walks on a treadmill for about an hour. He walks for about 2 miles. He denies any chest pain or chest tightness. His dyspnea is improving. Lower extremity swelling is also improving. He uses Lasix only as needed which is infrequent. No PND. No presyncope or syncope    Past Medical History:   Diagnosis Date    Borderline hypertension     GERD (gastroesophageal reflux disease)     Hyperlipidemia     Obesity     CHALINO (obstructive sleep apnea)     S/P cardiac cath 12/15    No evidence of CAD       Review of Systems:  Cardiac symptoms as noted above in HPI. All others negative. Current Outpatient Medications   Medication Sig    chlorthalidone (HYGROTON) 25 MG tablet TAKE 1 TABLET BY MOUTH DAILY    fluticasone (FLONASE) 50 MCG/ACT nasal spray nightly. furosemide (LASIX) 40 MG tablet Take 40 mg by mouth as needed    metoprolol succinate (TOPROL XL) 25 MG extended release tablet Take 25 mg by mouth daily    montelukast (SINGULAIR) 10 MG tablet Take 10 mg by mouth daily    nitroGLYCERIN (NITROSTAT) 0.4 MG SL tablet Place 0.4 mg under the tongue    simvastatin (ZOCOR) 20 MG tablet TAKE 1 TABLET BY MOUTH AT NIGHT     No current facility-administered medications for this visit.        Past Surgical History:   Procedure Laterality Date    HEMORRHOID SURGERY  1990    MOHS SURGERY  2006    TONSILLECTOMY  1960s    UPPER ARM/ELBOW

## 2024-01-12 RX ORDER — SIMVASTATIN 20 MG
TABLET ORAL
Qty: 90 TABLET | Refills: 2 | Status: SHIPPED | OUTPATIENT
Start: 2024-01-12

## 2024-01-12 NOTE — TELEPHONE ENCOUNTER
PCP: Raheel Matute MD    Last appt:  6/1/2023   Future Appointments   Date Time Provider Department Center   6/13/2024  9:15 AM Michael Eduardo MD [facility]       Requested Prescriptions     Pending Prescriptions Disp Refills    simvastatin (ZOCOR) 20 MG tablet [Pharmacy Med Name: SIMVASTATIN 20MG TABLETS] 90 tablet      Sig: TAKE 1 TABLET BY MOUTH AT NIGHT

## 2024-01-15 NOTE — TELEPHONE ENCOUNTER
PCP: Raheel Matute MD    Last appt:  6/1/2023   Future Appointments   Date Time Provider Department Center   6/13/2024  9:15 AM Michael Eduardo MD [facility]       Requested Prescriptions     Pending Prescriptions Disp Refills    metoprolol succinate (TOPROL XL) 25 MG extended release tablet [Pharmacy Med Name: METOPROLOL ER SUCCINATE 25MG TABS] 90 tablet 2     Sig: TAKE 1 TABLET BY MOUTH DAILY

## 2024-01-16 RX ORDER — METOPROLOL SUCCINATE 25 MG/1
25 TABLET, EXTENDED RELEASE ORAL DAILY
Qty: 90 TABLET | Refills: 2 | Status: SHIPPED | OUTPATIENT
Start: 2024-01-16

## 2024-01-22 RX ORDER — METOPROLOL SUCCINATE 25 MG/1
25 TABLET, EXTENDED RELEASE ORAL DAILY
Qty: 90 TABLET | OUTPATIENT
Start: 2024-01-22

## 2024-05-30 ENCOUNTER — HOSPITAL ENCOUNTER (OUTPATIENT)
Facility: HOSPITAL | Age: 64
Discharge: HOME | End: 2024-06-01
Attending: INTERNAL MEDICINE
Payer: OTHER GOVERNMENT

## 2024-05-30 DIAGNOSIS — N28.9 RENAL LESION: ICD-10-CM

## 2024-05-30 PROCEDURE — 93975 VASCULAR STUDY: CPT

## 2024-05-31 LAB
VAS AORTA MID AP: 1.88 CM
VAS AORTA MID PSV: 71.3 CM/S
VAS AORTA MID TRANS: 2.52 CM
VAS LEFT ARCUATE RENAL ARTERY EDV: 7.6 CM/S
VAS LEFT ARCUATE RENAL ARTERY PSV: 23.3 CM/S
VAS LEFT ARCUATE RENAL ARTERY RI: 0.67
VAS LEFT HILAR EDV: 22.9 CM/S
VAS LEFT HILAR PSV: 61.3 CM/S
VAS LEFT KIDNEY LENGTH: 12.69 CM
VAS LEFT KIDNEY WIDTH: 7.24 CM
VAS LEFT RENAL ARCUATE ACCEL TIME: 0.22 S
VAS LEFT RENAL MID EDV: 28 CM/S
VAS LEFT RENAL MID PSV: 101.6 CM/S
VAS LEFT RENAL MID RAR: 1.42
VAS LEFT RENAL MID RI: 0.72 NO UNITS
VAS LEFT RENAL MIDDLE PARENCHYMA EDV: 10.1 CM/S
VAS LEFT RENAL MIDDLE PARENCHYMA PSV: 32.6 CM/S
VAS LEFT RENAL MIDDLE PARENCHYMA RI: 0.69
VAS LEFT RENAL RAR: 1.42
VAS RIGHT ARCUATE RENAL ARTERY EDV: 9.7 CM/S
VAS RIGHT ARCUATE RENAL ARTERY PSV: 21.2 CM/S
VAS RIGHT ARCUATE RENAL ARTERY RI: 0.54
VAS RIGHT HILAR EDV: 25.9 CM/S
VAS RIGHT HILAR PSV: 51.2 CM/S
VAS RIGHT KIDNEY LENGTH: 13.18 CM
VAS RIGHT KIDNEY WIDTH: 6.82 CM
VAS RIGHT RENAL ARCUATE ACCEL TIME: 0.06 S
VAS RIGHT RENAL DIST EDV: 19.5 CM/S
VAS RIGHT RENAL DIST PSV: 73.6 CM/S
VAS RIGHT RENAL DIST RAR: 1.03
VAS RIGHT RENAL DIST RI: 0.74 NO UNITS
VAS RIGHT RENAL MIDDLE PARENCHYMA EDV: 8.6 CM/S
VAS RIGHT RENAL MIDDLE PARENCHYMA PSV: 44.9 CM/S
VAS RIGHT RENAL MIDDLE PARENCHYMA RI: 0.81
VAS RIGHT RENAL RAR: 1.03
VAS RIGHT RENAL SEGMENTAL ACCEL TIME: 0.11 S

## 2024-06-13 ENCOUNTER — OFFICE VISIT (OUTPATIENT)
Age: 64
End: 2024-06-13
Payer: OTHER GOVERNMENT

## 2024-06-13 VITALS
WEIGHT: 284 LBS | BODY MASS INDEX: 39.76 KG/M2 | SYSTOLIC BLOOD PRESSURE: 123 MMHG | OXYGEN SATURATION: 95 % | HEIGHT: 71 IN | DIASTOLIC BLOOD PRESSURE: 71 MMHG | HEART RATE: 76 BPM

## 2024-06-13 DIAGNOSIS — I10 ESSENTIAL (PRIMARY) HYPERTENSION: Primary | ICD-10-CM

## 2024-06-13 PROCEDURE — 3078F DIAST BP <80 MM HG: CPT | Performed by: INTERNAL MEDICINE

## 2024-06-13 PROCEDURE — 93000 ELECTROCARDIOGRAM COMPLETE: CPT | Performed by: INTERNAL MEDICINE

## 2024-06-13 PROCEDURE — 99214 OFFICE O/P EST MOD 30 MIN: CPT | Performed by: INTERNAL MEDICINE

## 2024-06-13 PROCEDURE — 3074F SYST BP LT 130 MM HG: CPT | Performed by: INTERNAL MEDICINE

## 2024-06-13 RX ORDER — KETOCONAZOLE 20 MG/ML
SHAMPOO TOPICAL
COMMUNITY
Start: 2024-06-11

## 2024-06-13 RX ORDER — TRIAMCINOLONE ACETONIDE 1 MG/G
CREAM TOPICAL
COMMUNITY
Start: 2024-06-11

## 2024-06-13 RX ORDER — CALCIPOTRIENE 50 UG/G
CREAM TOPICAL
COMMUNITY
Start: 2024-06-11

## 2024-06-13 RX ORDER — POLYETHYLENE GLYCOL-3350 AND ELECTROLYTES WITH FLAVOR PACK 240; 5.84; 2.98; 6.72; 22.72 G/278.26G; G/278.26G; G/278.26G; G/278.26G; G/278.26G
POWDER, FOR SOLUTION ORAL
COMMUNITY
Start: 2024-06-04

## 2024-06-13 RX ORDER — DOXYCYCLINE HYCLATE 20 MG
TABLET ORAL
COMMUNITY
Start: 2024-06-11

## 2024-06-13 RX ORDER — CLOBETASOL PROPIONATE 0.5 MG/G
CREAM TOPICAL
COMMUNITY
Start: 2024-06-11

## 2024-06-13 ASSESSMENT — PATIENT HEALTH QUESTIONNAIRE - PHQ9
SUM OF ALL RESPONSES TO PHQ9 QUESTIONS 1 & 2: 0
2. FEELING DOWN, DEPRESSED OR HOPELESS: NOT AT ALL
SUM OF ALL RESPONSES TO PHQ QUESTIONS 1-9: 0
1. LITTLE INTEREST OR PLEASURE IN DOING THINGS: NOT AT ALL
SUM OF ALL RESPONSES TO PHQ QUESTIONS 1-9: 0

## 2024-06-13 NOTE — PROGRESS NOTES
Identified pt with two pt identifiers(name and ). Reviewed record in preparation for visit and have obtained necessary documentation.    Tank Petersen presents today for   Chief Complaint   Patient presents with    Follow-up       Pt c/          Tank Petersen preferred language for health care discussion is english/other.    Personal Protective Equipment:   Personal Protective Equipment was used including: mask-surgical and hands-gloves. Patient was placed on no precaution(s). Patient was masked.    Precautions:   Patient currently on None  Patient currently roomed with door closed.    Is someone accompanying this pt? no    Is the patient using any DME equipment during OV? no    Depression Screenin/13/2024     9:14 AM 9/15/2022     9:47 AM   PHQ-9 Questionaire   Little interest or pleasure in doing things 0 0   Feeling down, depressed, or hopeless 0 0   PHQ-9 Total Score 0 0        Learning Assessment:  No question data found.    Abuse Screenin/1/2023     9:00 AM   AMB Abuse Screening   Do you ever feel afraid of your partner? N   Are you in a relationship with someone who physically or mentally threatens you? N   Is it safe for you to go home? Y          Fall Risk      2023     9:09 AM   Fall Risk   2 or more falls in past year? no   Fall with injury in past year? no         Pt currently taking Anticoagulant /Antiplatelet therapy? aspirin    Coordination of Care:  1. Have you been to the ER, urgent care clinic since your last visit? Hospitalized since your last visit? no    2. Have you seen or consulted any other health care providers outside of the [facility] since your last visit? Include any pap smears or colon screening. no      Please see Red banners under Allergies and Med Rec to remove outside inquires. All correct information has been verified with patient and added to chart.     Medication's patient's would liked removed has been marked not taking to be removed per 
64    Cardiac catheterization(06/2010)   mild luminal irregularities of all three major epicardial arteries.  No significant stenosis.  LV ejection fraction was 55%.    CARDIAC CATH (12/15)  LV Function: 60 % . No MR. No AS.  LVEDP: 11 mm Hg   RCA: Large, distally divided to PL and PDA branches and they are all patent  Left main: Normal  LAD: LAD and Diagonal, good caliber and patent  Circumflex: C0-dominant, large OM1, fair posterior circumflex, and they are all patent.    Holter Monitor (12/19)  Tank Petersen was in Sinus.  The average heart rate, excluding ectopy, was 88 BPM with a minimum of 66 BPM at 07:32 D2 and a maximum of 128 BPM at 14:34 D1.  Heart beats, including ectopy, totaled 776544 beats.  VENTRICULAR ECTOPICS totaled 77513 averaging 471.9 per hour with 39567 single, 2 paired, 572 trigeminy and 0 R on T. BIGEMINY runs  occurred 605 times and totaled 2663 beats. PVC [place] 8.6%  SUPRAVENTRICULAR ECTOPICS totaled 5 ,with 5 single and 0 paired beats.  Patient diary was submitted, no symptoms noted. No patient triggered events noted.    TRANSTHORACIC ECHOCARDIOGRAM (TTE) COMPLETE (CONTRAST/BUBBLE/3D PRN) 10/20/2022  1:13 PM, 10/20/2022 12:00 AM (Final)    Left Ventricle: Normal left ventricular systolic function with a visually estimated EF of 55 - 60%. Left ventricle size is normal. Normal wall thickness. Normal wall motion. Indeterminate diastolic function.    Right Ventricle: Normal systolic function. TAPSE is normal.    Aortic Valve: Tricuspid valve. Trace regurgitation. No stenosis.    Mitral Valve: Mildly thickened leaflet. Trace regurgitation. No stenosis noted.    Tricuspid Valve: Trace regurgitation. No stenosis noted.    Contrast used: Definity.     ASSESSMENT and PLAN  Mr. Petersen is a pleasant 64 y.o.  gentleman with a history of hypertension, hyperlipidemia, and sleep apnea.      Hypertension: /70. Currently Toprol-XL 25 mg daily.  He is taking Lasix only as needed.  Salt

## 2024-10-10 RX ORDER — METOPROLOL SUCCINATE 25 MG/1
25 TABLET, EXTENDED RELEASE ORAL DAILY
Qty: 90 TABLET | Refills: 2 | Status: SHIPPED | OUTPATIENT
Start: 2024-10-10

## 2024-10-10 NOTE — TELEPHONE ENCOUNTER
PCP: Raheel Matute MD    Last appt:  6/13/2024   Future Appointments   Date Time Provider Department Center   6/19/2025  9:30 AM Yamilka Thorne PA-C [facility]       Requested Prescriptions     Pending Prescriptions Disp Refills    metoprolol succinate (TOPROL XL) 25 MG extended release tablet [Pharmacy Med Name: METOPROLOL ER SUCCINATE 25MG TABS] 90 tablet 2     Sig: TAKE 1 TABLET BY MOUTH DAILY       Request for a 30 or 90 day supply? Provider Discretion    Pharmacy: confirmed     Other Comments:n/a

## 2024-10-31 RX ORDER — SIMVASTATIN 20 MG
20 TABLET ORAL NIGHTLY
Qty: 90 TABLET | Refills: 2 | Status: SHIPPED | OUTPATIENT
Start: 2024-10-31

## 2024-10-31 NOTE — TELEPHONE ENCOUNTER
PCP: Raheel Matute MD    Last appt:  6/13/2024   Future Appointments   Date Time Provider Department Center   6/19/2025  9:30 AM Yamilka Thorne PA-C [facility]       Requested Prescriptions     Pending Prescriptions Disp Refills    simvastatin (ZOCOR) 20 MG tablet 90 tablet 2     Sig: Take 1 tablet by mouth nightly       Request for a 30 or 90 day supply? Provider Discretion    Pharmacy: confirmed     Other Comments:n/a

## 2025-06-19 ENCOUNTER — OFFICE VISIT (OUTPATIENT)
Age: 65
End: 2025-06-19
Payer: MEDICARE

## 2025-06-19 VITALS
BODY MASS INDEX: 43.23 KG/M2 | HEIGHT: 70 IN | OXYGEN SATURATION: 96 % | HEART RATE: 94 BPM | WEIGHT: 302 LBS | SYSTOLIC BLOOD PRESSURE: 132 MMHG | DIASTOLIC BLOOD PRESSURE: 84 MMHG

## 2025-06-19 DIAGNOSIS — R06.02 SHORTNESS OF BREATH: ICD-10-CM

## 2025-06-19 DIAGNOSIS — I10 ESSENTIAL (PRIMARY) HYPERTENSION: Primary | ICD-10-CM

## 2025-06-19 DIAGNOSIS — I49.3 PVC'S (PREMATURE VENTRICULAR CONTRACTIONS): ICD-10-CM

## 2025-06-19 DIAGNOSIS — I10 HYPERTENSION, UNSPECIFIED TYPE: ICD-10-CM

## 2025-06-19 DIAGNOSIS — E78.5 HYPERLIPIDEMIA, UNSPECIFIED HYPERLIPIDEMIA TYPE: ICD-10-CM

## 2025-06-19 DIAGNOSIS — R60.0 BILATERAL LEG EDEMA: ICD-10-CM

## 2025-06-19 DIAGNOSIS — G47.33 OSA (OBSTRUCTIVE SLEEP APNEA): ICD-10-CM

## 2025-06-19 PROCEDURE — 3075F SYST BP GE 130 - 139MM HG: CPT | Performed by: PHYSICIAN ASSISTANT

## 2025-06-19 PROCEDURE — 1036F TOBACCO NON-USER: CPT | Performed by: PHYSICIAN ASSISTANT

## 2025-06-19 PROCEDURE — G8427 DOCREV CUR MEDS BY ELIG CLIN: HCPCS | Performed by: PHYSICIAN ASSISTANT

## 2025-06-19 PROCEDURE — G8417 CALC BMI ABV UP PARAM F/U: HCPCS | Performed by: PHYSICIAN ASSISTANT

## 2025-06-19 PROCEDURE — 3079F DIAST BP 80-89 MM HG: CPT | Performed by: PHYSICIAN ASSISTANT

## 2025-06-19 PROCEDURE — 99214 OFFICE O/P EST MOD 30 MIN: CPT | Performed by: PHYSICIAN ASSISTANT

## 2025-06-19 PROCEDURE — 3017F COLORECTAL CA SCREEN DOC REV: CPT | Performed by: PHYSICIAN ASSISTANT

## 2025-06-19 PROCEDURE — 1123F ACP DISCUSS/DSCN MKR DOCD: CPT | Performed by: PHYSICIAN ASSISTANT

## 2025-06-19 PROCEDURE — 93000 ELECTROCARDIOGRAM COMPLETE: CPT | Performed by: PHYSICIAN ASSISTANT

## 2025-06-19 RX ORDER — METOPROLOL SUCCINATE 25 MG/1
25 TABLET, EXTENDED RELEASE ORAL DAILY
Qty: 90 TABLET | Refills: 2 | Status: SHIPPED | OUTPATIENT
Start: 2025-06-19

## 2025-06-19 RX ORDER — SIMVASTATIN 20 MG
20 TABLET ORAL NIGHTLY
Qty: 90 TABLET | Refills: 2 | Status: SHIPPED | OUTPATIENT
Start: 2025-06-19

## 2025-06-19 RX ORDER — ASPIRIN 81 MG/1
81 TABLET ORAL DAILY
Qty: 30 TABLET | Refills: 6 | Status: SHIPPED | OUTPATIENT
Start: 2025-06-19

## 2025-06-19 RX ORDER — SIMVASTATIN 20 MG
20 TABLET ORAL NIGHTLY
Qty: 90 TABLET | Refills: 2 | Status: SHIPPED | OUTPATIENT
Start: 2025-06-19 | End: 2025-06-19

## 2025-06-19 RX ORDER — FUROSEMIDE 40 MG/1
40 TABLET ORAL PRN
Qty: 60 TABLET | Refills: 3 | Status: SHIPPED | OUTPATIENT
Start: 2025-06-19

## 2025-06-19 ASSESSMENT — PATIENT HEALTH QUESTIONNAIRE - PHQ9
SUM OF ALL RESPONSES TO PHQ QUESTIONS 1-9: 0
SUM OF ALL RESPONSES TO PHQ QUESTIONS 1-9: 0
1. LITTLE INTEREST OR PLEASURE IN DOING THINGS: NOT AT ALL
SUM OF ALL RESPONSES TO PHQ QUESTIONS 1-9: 0
SUM OF ALL RESPONSES TO PHQ QUESTIONS 1-9: 0
2. FEELING DOWN, DEPRESSED OR HOPELESS: NOT AT ALL

## 2025-06-19 NOTE — PROGRESS NOTES
Identified pt with two pt identifiers(name and ). Reviewed record in preparation for visit and have obtained necessary documentation.    Tank Petersen presents today for   Chief Complaint   Patient presents with    Follow-up     1 yr f/u         Pt denies c/o DIZZINESS, SOB, CHEST PAIN/ PRESSURE, FATIGUE/WEAKNESS, HEADACHES, SWELLING.             Tank Petersen preferred language for health care discussion is english/other.    Personal Protective Equipment:   Personal Protective Equipment was used including: mask-surgical and hands-gloves. Patient was placed on no precaution(s). Patient was not masked.    Precautions:   Patient currently on None  Patient currently roomed with door closed.    Is someone accompanying this pt? no    Is the patient using any DME equipment during OV? no    Depression Screenin/19/2025     9:58 AM 2024     9:14 AM 9/15/2022     9:47 AM   PHQ-9 Questionaire   Little interest or pleasure in doing things 0 0 0   Feeling down, depressed, or hopeless 0 0 0   PHQ-9 Total Score 0 0 0        Learning Assessment:  Who is the primary learner? Patient    What is the preferred language for health care of the primary learner? ENGLISH    How does the primary learner prefer to learn new concepts? DEMONSTRATION    Answered By patient    Relationship to Learner SELF        Abuse Screenin/19/2025     9:00 AM 2023     9:00 AM   AMB Abuse Screening   Do you ever feel afraid of your partner? N N   Are you in a relationship with someone who physically or mentally threatens you? N N   Is it safe for you to go home? Y Y        Fall Risk      2025     9:21 AM 2023     9:09 AM   Fall Risk   Do you feel unsteady or are you worried about falling?  no no   2 or more falls in past year? no no   Fall with injury in past year? no no         Pt currently taking Anticoagulant /Antiplatelet therapy? Aspirin 81mg    Coordination of Care:  1. Have you been to the ER, urgent care clinic

## 2025-06-19 NOTE — PROGRESS NOTES
[facility] Cardiology     Tank Petersen is a 65 y.o. male      Presents to the office today for follow up. Since his last visit, he reports gaining ~20 lbs. He recently hurt his knee and his functional status has been limited. He is currently taking lasix daily due to worsening leg swelling. He admits to rare, infrequent episodes of chest pain, lasting for a few seconds in duration, only while lying flat at night. Denies exertional CP.  Reports SOB with moderate activity such as climbing up a few flights of stairs. He attributes this to his recent weight gain.     Denies diaphoresis, N/V/D, weight gain, orthopnea, PND, palpitations, near syncope or syncope, dizziness    Past Medical History:   Diagnosis Date    Borderline hypertension     GERD (gastroesophageal reflux disease)     Hyperlipidemia     Obesity     CHALINO (obstructive sleep apnea)     S/P cardiac cath 12/15    No evidence of CAD       Past Surgical History:   Procedure Laterality Date    HEMORRHOID SURGERY  1990    MOHS SURGERY  2006    TONSILLECTOMY  1960s    UPPER ARM/ELBOW SURGERY UNLISTED         Current Outpatient Medications   Medication Sig    simvastatin (ZOCOR) 20 MG tablet Take 1 tablet by mouth nightly    metoprolol succinate (TOPROL XL) 25 MG extended release tablet TAKE 1 TABLET BY MOUTH DAILY    furosemide (LASIX) 40 MG tablet Take 1 tablet by mouth as needed    nitroGLYCERIN (NITROSTAT) 0.4 MG SL tablet Place 1 tablet under the tongue    calcipotriene (DOVONEX) 0.005 % cream     clobetasol (TEMOVATE) 0.05 % cream     doxycycline hyclate (PERIOSTAT) 20 MG tablet     ketoconazole (NIZORAL) 2 % shampoo     metFORMIN (GLUCOPHAGE) 500 MG tablet Take 1 tablet by mouth 2 times daily (with meals)    GAVILYTE-C 240 g solution     tretinoin (RETIN-A) 0.025 % cream     triamcinolone (KENALOG) 0.1 % cream     aspirin 81 MG EC tablet Take 1 tablet by mouth daily    fluticasone (FLONASE) 50 MCG/ACT nasal spray nightly.    montelukast (SINGULAIR) 10 MG